# Patient Record
Sex: MALE | Race: WHITE
[De-identification: names, ages, dates, MRNs, and addresses within clinical notes are randomized per-mention and may not be internally consistent; named-entity substitution may affect disease eponyms.]

---

## 2017-01-30 PROBLEM — M17.12 PRIMARY OSTEOARTHRITIS OF LEFT KNEE: Status: ACTIVE | Noted: 2017-01-30

## 2017-03-01 ENCOUNTER — PRIOR ORIGINAL RECORDS (OUTPATIENT)
Dept: OTHER | Age: 62
End: 2017-03-01

## 2017-03-14 ENCOUNTER — HOSPITAL (OUTPATIENT)
Dept: OTHER | Age: 62
End: 2017-03-14
Attending: INTERNAL MEDICINE

## 2017-03-14 ENCOUNTER — CHARTING TRANS (OUTPATIENT)
Dept: OTHER | Age: 62
End: 2017-03-14

## 2017-03-15 LAB
ANALYZER ANC (IANC): ABNORMAL
ANION GAP SERPL CALC-SCNC: 12 MMOL/L (ref 10–20)
BASOPHILS # BLD: 0.1 THOUSAND/MCL (ref 0–0.3)
BASOPHILS NFR BLD: 1 %
BUN SERPL-MCNC: 23 MG/DL (ref 6–20)
BUN/CREAT SERPL: 23 (ref 7–25)
CALCIUM SERPL-MCNC: 8.7 MG/DL (ref 8.4–10.2)
CHLORIDE: 106 MMOL/L (ref 98–107)
CO2 SERPL-SCNC: 28 MMOL/L (ref 21–32)
CREAT SERPL-MCNC: 0.98 MG/DL (ref 0.67–1.17)
DIFFERENTIAL METHOD BLD: ABNORMAL
EOSINOPHIL # BLD: 0.1 THOUSAND/MCL (ref 0.1–0.5)
EOSINOPHIL NFR BLD: 1 %
ERYTHROCYTE [DISTWIDTH] IN BLOOD: 15.7 % (ref 11–15)
GLUCOSE SERPL-MCNC: 123 MG/DL (ref 65–99)
HEMATOCRIT: 42.1 % (ref 39–51)
HGB BLD-MCNC: 14 GM/DL (ref 13–17)
INR PPP: 1.9
LYMPHOCYTES # BLD: 1.4 THOUSAND/MCL (ref 1–4)
LYMPHOCYTES NFR BLD: 13 %
MAGNESIUM SERPL-MCNC: 2 MG/DL (ref 1.7–2.4)
MCH RBC QN AUTO: 27.3 PG (ref 26–34)
MCHC RBC AUTO-ENTMCNC: 33.3 GM/DL (ref 32–36.5)
MCV RBC AUTO: 82.2 FL (ref 78–100)
MONOCYTES # BLD: 0.8 THOUSAND/MCL (ref 0.3–0.9)
MONOCYTES NFR BLD: 7 %
NEUTROPHILS # BLD: 8.2 THOUSAND/MCL (ref 1.8–7.7)
NEUTROPHILS NFR BLD: 78 %
NEUTS SEG NFR BLD: ABNORMAL %
PERCENT NRBC: ABNORMAL
PLATELET # BLD: 155 THOUSAND/MCL (ref 140–450)
POTASSIUM SERPL-SCNC: 3.6 MMOL/L (ref 3.4–5.1)
PROTHROMBIN TIME: 20.9 SECONDS (ref 9.7–11.8)
PROTHROMBIN TIME: ABNORMAL
RBC # BLD: 5.12 MILLION/MCL (ref 4.5–5.9)
SODIUM SERPL-SCNC: 142 MMOL/L (ref 135–145)
TSH SERPL-ACNC: 1.45 MCUNIT/ML (ref 0.35–5)
WBC # BLD: 10.5 THOUSAND/MCL (ref 4.2–11)

## 2017-03-16 LAB
ANALYZER ANC (IANC): ABNORMAL
ANION GAP SERPL CALC-SCNC: 16 MMOL/L (ref 10–20)
BASOPHILS # BLD: 0 THOUSAND/MCL (ref 0–0.3)
BASOPHILS NFR BLD: 0 %
BUN SERPL-MCNC: 22 MG/DL (ref 6–20)
BUN/CREAT SERPL: 21 (ref 7–25)
CALCIUM SERPL-MCNC: 8.7 MG/DL (ref 8.4–10.2)
CHLORIDE: 104 MMOL/L (ref 98–107)
CO2 SERPL-SCNC: 24 MMOL/L (ref 21–32)
CREAT SERPL-MCNC: 1.03 MG/DL (ref 0.67–1.17)
DIFFERENTIAL METHOD BLD: ABNORMAL
EOSINOPHIL # BLD: 0.1 THOUSAND/MCL (ref 0.1–0.5)
EOSINOPHIL NFR BLD: 1 %
ERYTHROCYTE [DISTWIDTH] IN BLOOD: 15.5 % (ref 11–15)
GLUCOSE SERPL-MCNC: 117 MG/DL (ref 65–99)
HEMATOCRIT: 41.9 % (ref 39–51)
HGB BLD-MCNC: 14 GM/DL (ref 13–17)
INR PPP: 1.5
LYMPHOCYTES # BLD: 1.1 THOUSAND/MCL (ref 1–4)
LYMPHOCYTES NFR BLD: 11 %
MCH RBC QN AUTO: 27.3 PG (ref 26–34)
MCHC RBC AUTO-ENTMCNC: 33.4 GM/DL (ref 32–36.5)
MCV RBC AUTO: 81.8 FL (ref 78–100)
MONOCYTES # BLD: 0.9 THOUSAND/MCL (ref 0.3–0.9)
MONOCYTES NFR BLD: 9 %
NEUTROPHILS # BLD: 8.2 THOUSAND/MCL (ref 1.8–7.7)
NEUTROPHILS NFR BLD: 79 %
NEUTS SEG NFR BLD: ABNORMAL %
PERCENT NRBC: ABNORMAL
PLATELET # BLD: 166 THOUSAND/MCL (ref 140–450)
POTASSIUM SERPL-SCNC: 3.5 MMOL/L (ref 3.4–5.1)
PROTHROMBIN TIME: 16.4 SECONDS (ref 9.7–11.8)
PROTHROMBIN TIME: ABNORMAL
RBC # BLD: 5.12 MILLION/MCL (ref 4.5–5.9)
SODIUM SERPL-SCNC: 140 MMOL/L (ref 135–145)
WBC # BLD: 10.3 THOUSAND/MCL (ref 4.2–11)

## 2017-03-20 ENCOUNTER — PRIOR ORIGINAL RECORDS (OUTPATIENT)
Dept: OTHER | Age: 62
End: 2017-03-20

## 2017-03-31 PROBLEM — Z99.89 OSA ON CPAP: Status: ACTIVE | Noted: 2017-03-31

## 2017-03-31 PROBLEM — I34.0 NON-RHEUMATIC MITRAL REGURGITATION: Status: ACTIVE | Noted: 2017-03-31

## 2017-03-31 PROBLEM — Z79.01 ANTICOAGULATED ON COUMADIN: Status: ACTIVE | Noted: 2017-03-31

## 2017-03-31 PROBLEM — I42.8 NON-ISCHEMIC CARDIOMYOPATHY (HCC): Status: ACTIVE | Noted: 2017-03-31

## 2017-03-31 PROBLEM — R94.30 CARDIAC LV EJECTION FRACTION 21-40%: Status: ACTIVE | Noted: 2017-03-31

## 2017-03-31 PROBLEM — I36.1 NON-RHEUMATIC TRICUSPID VALVE INSUFFICIENCY: Status: ACTIVE | Noted: 2017-03-31

## 2017-03-31 PROBLEM — G47.33 OSA ON CPAP: Status: ACTIVE | Noted: 2017-03-31

## 2017-03-31 PROBLEM — I25.10 CORONARY ARTERY DISEASE INVOLVING NATIVE CORONARY ARTERY OF NATIVE HEART WITHOUT ANGINA PECTORIS: Status: ACTIVE | Noted: 2017-03-31

## 2017-04-07 LAB
BUN: 21 MG/DL
CALCIUM: 8.6 MG/DL
CHLORIDE: 104 MEQ/L
CREATININE, SERUM: 1.07 MG/DL
GLUCOSE: 97 MG/DL
HEMATOCRIT: 33 %
HEMOGLOBIN: 11.3 G/DL
MCH: 28.2 PG
MCHC: 342 G/DL
MCV: 82.3 FL
PLATELETS: 270 K/UL
POTASSIUM, SERUM: 4 MEQ/L
RED BLOOD COUNT: 4.01 X 10-6/U
SODIUM: 142 MEQ/L
WHITE BLOOD COUNT: 15.5 X 10-3/U

## 2017-04-10 ENCOUNTER — PRIOR ORIGINAL RECORDS (OUTPATIENT)
Dept: OTHER | Age: 62
End: 2017-04-10

## 2017-05-10 ENCOUNTER — PRIOR ORIGINAL RECORDS (OUTPATIENT)
Dept: OTHER | Age: 62
End: 2017-05-10

## 2017-08-07 PROBLEM — T84.84XA PAINFUL TOTAL KNEE REPLACEMENT, INITIAL ENCOUNTER  (HCC): Status: ACTIVE | Noted: 2017-08-07

## 2017-08-07 PROBLEM — Z96.659 PAINFUL TOTAL KNEE REPLACEMENT, INITIAL ENCOUNTER  (HCC): Status: ACTIVE | Noted: 2017-08-07

## 2017-08-07 PROBLEM — T84.84XA PAINFUL TOTAL KNEE REPLACEMENT, INITIAL ENCOUNTER (HCC): Status: ACTIVE | Noted: 2017-08-07

## 2017-08-07 PROBLEM — Z96.659 PAINFUL TOTAL KNEE REPLACEMENT, INITIAL ENCOUNTER (HCC): Status: ACTIVE | Noted: 2017-08-07

## 2017-08-07 PROBLEM — Z96.651 H/O TOTAL KNEE REPLACEMENT, RIGHT: Status: ACTIVE | Noted: 2017-08-07

## 2017-08-07 PROBLEM — Z96.659 PAINFUL TOTAL KNEE REPLACEMENT, INITIAL ENCOUNTER: Status: ACTIVE | Noted: 2017-08-07

## 2017-08-07 PROBLEM — T84.84XA PAINFUL TOTAL KNEE REPLACEMENT, INITIAL ENCOUNTER: Status: ACTIVE | Noted: 2017-08-07

## 2017-08-21 ENCOUNTER — PRIOR ORIGINAL RECORDS (OUTPATIENT)
Dept: OTHER | Age: 62
End: 2017-08-21

## 2017-08-24 PROBLEM — M76.31 ILIOTIBIAL BAND SYNDROME, RIGHT: Status: ACTIVE | Noted: 2017-08-24

## 2017-08-24 PROBLEM — S82.001A CLOSED NONDISPLACED FRACTURE OF RIGHT PATELLA, UNSPECIFIED FRACTURE MORPHOLOGY, INITIAL ENCOUNTER: Status: ACTIVE | Noted: 2017-08-24

## 2017-11-07 PROBLEM — M97.8XXA: Status: ACTIVE | Noted: 2017-11-07

## 2017-11-07 PROBLEM — Z96.659: Status: ACTIVE | Noted: 2017-11-07

## 2017-12-11 ENCOUNTER — PRIOR ORIGINAL RECORDS (OUTPATIENT)
Dept: OTHER | Age: 62
End: 2017-12-11

## 2018-09-28 ENCOUNTER — PRIOR ORIGINAL RECORDS (OUTPATIENT)
Dept: OTHER | Age: 63
End: 2018-09-28

## 2018-10-15 ENCOUNTER — MYAURORA ACCOUNT LINK (OUTPATIENT)
Dept: OTHER | Age: 63
End: 2018-10-15

## 2018-10-15 ENCOUNTER — PRIOR ORIGINAL RECORDS (OUTPATIENT)
Dept: OTHER | Age: 63
End: 2018-10-15

## 2019-02-28 VITALS
HEART RATE: 88 BPM | SYSTOLIC BLOOD PRESSURE: 118 MMHG | RESPIRATION RATE: 20 BRPM | WEIGHT: 278 LBS | DIASTOLIC BLOOD PRESSURE: 80 MMHG | HEIGHT: 71 IN | BODY MASS INDEX: 38.92 KG/M2

## 2019-02-28 VITALS
HEIGHT: 71 IN | DIASTOLIC BLOOD PRESSURE: 80 MMHG | OXYGEN SATURATION: 98 % | SYSTOLIC BLOOD PRESSURE: 136 MMHG | WEIGHT: 268 LBS | BODY MASS INDEX: 37.52 KG/M2 | HEART RATE: 87 BPM | RESPIRATION RATE: 18 BRPM

## 2019-02-28 VITALS
RESPIRATION RATE: 20 BRPM | BODY MASS INDEX: 38.78 KG/M2 | WEIGHT: 277 LBS | HEIGHT: 71 IN | HEART RATE: 100 BPM | SYSTOLIC BLOOD PRESSURE: 112 MMHG | DIASTOLIC BLOOD PRESSURE: 80 MMHG

## 2019-03-01 VITALS
SYSTOLIC BLOOD PRESSURE: 119 MMHG | RESPIRATION RATE: 20 BRPM | WEIGHT: 262 LBS | BODY MASS INDEX: 36.68 KG/M2 | HEIGHT: 71 IN | HEART RATE: 96 BPM | DIASTOLIC BLOOD PRESSURE: 77 MMHG

## 2019-03-01 VITALS
RESPIRATION RATE: 18 BRPM | HEIGHT: 71 IN | WEIGHT: 260 LBS | DIASTOLIC BLOOD PRESSURE: 79 MMHG | SYSTOLIC BLOOD PRESSURE: 138 MMHG | HEART RATE: 84 BPM | BODY MASS INDEX: 36.4 KG/M2

## 2019-03-04 PROBLEM — R94.30 CARDIAC LV EJECTION FRACTION 21-40%: Status: RESOLVED | Noted: 2017-03-31 | Resolved: 2019-03-04

## 2019-03-13 ENCOUNTER — TELEPHONE (OUTPATIENT)
Dept: SCHEDULING | Age: 64
End: 2019-03-13

## 2019-04-17 ENCOUNTER — LAB ENCOUNTER (OUTPATIENT)
Dept: LAB | Age: 64
End: 2019-04-17
Attending: NURSE PRACTITIONER
Payer: COMMERCIAL

## 2019-04-17 DIAGNOSIS — I48.20 CHRONIC ATRIAL FIBRILLATION (HCC): ICD-10-CM

## 2019-04-17 PROCEDURE — 85025 COMPLETE CBC W/AUTO DIFF WBC: CPT

## 2019-04-17 PROCEDURE — 80053 COMPREHEN METABOLIC PANEL: CPT

## 2019-04-17 PROCEDURE — 80061 LIPID PANEL: CPT

## 2019-04-24 PROBLEM — I25.10 CORONARY ARTERY DISEASE INVOLVING NATIVE CORONARY ARTERY OF NATIVE HEART WITHOUT ANGINA PECTORIS: Status: RESOLVED | Noted: 2017-03-31 | Resolved: 2019-04-24

## 2019-04-24 PROBLEM — I42.8 NICM (NONISCHEMIC CARDIOMYOPATHY) (HCC): Status: ACTIVE | Noted: 2017-03-31

## 2020-05-13 PROBLEM — Z79.01 LONG TERM (CURRENT) USE OF ANTICOAGULANTS: Status: ACTIVE | Noted: 2020-05-13

## 2020-11-05 ENCOUNTER — LAB ENCOUNTER (OUTPATIENT)
Dept: LAB | Age: 65
End: 2020-11-05
Attending: PHYSICIAN ASSISTANT
Payer: COMMERCIAL

## 2021-01-27 DIAGNOSIS — Z23 NEED FOR VACCINATION: ICD-10-CM

## 2021-07-01 ENCOUNTER — LAB SERVICES (OUTPATIENT)
Dept: LAB | Age: 66
End: 2021-07-01

## 2022-03-22 PROBLEM — Z79.01 LONG TERM (CURRENT) USE OF ANTICOAGULANTS: Status: RESOLVED | Noted: 2020-05-13 | Resolved: 2022-02-01

## 2022-09-15 RX ORDER — FUROSEMIDE 20 MG/1
20 TABLET ORAL DAILY
COMMUNITY

## 2022-09-30 ENCOUNTER — ANESTHESIA EVENT (OUTPATIENT)
Dept: SURGERY | Facility: HOSPITAL | Age: 67
End: 2022-09-30
Payer: COMMERCIAL

## 2022-10-03 ENCOUNTER — LAB ENCOUNTER (OUTPATIENT)
Dept: LAB | Age: 67
End: 2022-10-03
Attending: ORTHOPAEDIC SURGERY
Payer: COMMERCIAL

## 2022-10-03 ENCOUNTER — LABORATORY ENCOUNTER (OUTPATIENT)
Dept: LAB | Age: 67
End: 2022-10-03
Attending: ORTHOPAEDIC SURGERY
Payer: COMMERCIAL

## 2022-10-03 DIAGNOSIS — Z01.818 PRE-OP TESTING: ICD-10-CM

## 2022-10-03 LAB
ANTIBODY SCREEN: NEGATIVE
RH BLOOD TYPE: POSITIVE

## 2022-10-03 PROCEDURE — 86901 BLOOD TYPING SEROLOGIC RH(D): CPT

## 2022-10-03 PROCEDURE — 86850 RBC ANTIBODY SCREEN: CPT

## 2022-10-03 PROCEDURE — 86900 BLOOD TYPING SEROLOGIC ABO: CPT

## 2022-10-04 LAB — SARS-COV-2 RNA RESP QL NAA+PROBE: NOT DETECTED

## 2022-10-06 ENCOUNTER — HOSPITAL ENCOUNTER (OUTPATIENT)
Facility: HOSPITAL | Age: 67
Discharge: HOME HEALTH | End: 2022-10-07
Attending: ORTHOPAEDIC SURGERY | Admitting: ORTHOPAEDIC SURGERY
Payer: COMMERCIAL

## 2022-10-06 ENCOUNTER — APPOINTMENT (OUTPATIENT)
Dept: GENERAL RADIOLOGY | Facility: HOSPITAL | Age: 67
End: 2022-10-06
Attending: PHYSICIAN ASSISTANT
Payer: COMMERCIAL

## 2022-10-06 ENCOUNTER — ANESTHESIA (OUTPATIENT)
Dept: SURGERY | Facility: HOSPITAL | Age: 67
End: 2022-10-06
Payer: COMMERCIAL

## 2022-10-06 DIAGNOSIS — Z01.818 PRE-OP TESTING: Primary | ICD-10-CM

## 2022-10-06 PROCEDURE — 76942 ECHO GUIDE FOR BIOPSY: CPT | Performed by: ANESTHESIOLOGY

## 2022-10-06 PROCEDURE — 73560 X-RAY EXAM OF KNEE 1 OR 2: CPT | Performed by: PHYSICIAN ASSISTANT

## 2022-10-06 PROCEDURE — 97116 GAIT TRAINING THERAPY: CPT

## 2022-10-06 PROCEDURE — 88311 DECALCIFY TISSUE: CPT | Performed by: ORTHOPAEDIC SURGERY

## 2022-10-06 PROCEDURE — 97161 PT EVAL LOW COMPLEX 20 MIN: CPT

## 2022-10-06 PROCEDURE — 88305 TISSUE EXAM BY PATHOLOGIST: CPT | Performed by: ORTHOPAEDIC SURGERY

## 2022-10-06 PROCEDURE — 0SRD069 REPLACEMENT OF LEFT KNEE JOINT WITH OXIDIZED ZIRCONIUM ON POLYETHYLENE SYNTHETIC SUBSTITUTE, CEMENTED, OPEN APPROACH: ICD-10-PCS | Performed by: ORTHOPAEDIC SURGERY

## 2022-10-06 DEVICE — SMARTSET GHV GENTAMICIN HIGH VISCOSITY BONE CEMENT 40G
Type: IMPLANTABLE DEVICE | Site: KNEE | Status: FUNCTIONAL
Brand: SMARTSET

## 2022-10-06 DEVICE — ATTUNE PATELLA MEDIALIZED ANATOMIC 38MM CEMENTED AOX
Type: IMPLANTABLE DEVICE | Site: KNEE | Status: FUNCTIONAL
Brand: ATTUNE

## 2022-10-06 DEVICE — ATTUNE KNEE SYSTEM FEMORAL POSTERIOR STABILIZED SIZE 7 LEFT CEMENTED
Type: IMPLANTABLE DEVICE | Site: KNEE | Status: FUNCTIONAL
Brand: ATTUNE

## 2022-10-06 DEVICE — ATTUNE KNEE SYSTEM TIBIAL BASE ROTATING PLATFORM SIZE 6 CEMENTED
Type: IMPLANTABLE DEVICE | Site: KNEE | Status: FUNCTIONAL
Brand: ATTUNE

## 2022-10-06 DEVICE — ATTUNE KNEE SYSTEM TIBIAL INSERT ROTATING PLATFORM POSTERIOR STABILIZED 7 5MM AOX
Type: IMPLANTABLE DEVICE | Site: KNEE | Status: FUNCTIONAL
Brand: ATTUNE

## 2022-10-06 RX ORDER — SODIUM CHLORIDE, SODIUM LACTATE, POTASSIUM CHLORIDE, CALCIUM CHLORIDE 600; 310; 30; 20 MG/100ML; MG/100ML; MG/100ML; MG/100ML
INJECTION, SOLUTION INTRAVENOUS CONTINUOUS
Status: DISCONTINUED | OUTPATIENT
Start: 2022-10-06 | End: 2022-10-06 | Stop reason: HOSPADM

## 2022-10-06 RX ORDER — HYDROMORPHONE HYDROCHLORIDE 1 MG/ML
0.6 INJECTION, SOLUTION INTRAMUSCULAR; INTRAVENOUS; SUBCUTANEOUS EVERY 5 MIN PRN
Status: DISCONTINUED | OUTPATIENT
Start: 2022-10-06 | End: 2022-10-06 | Stop reason: HOSPADM

## 2022-10-06 RX ORDER — MELATONIN
325
Status: DISCONTINUED | OUTPATIENT
Start: 2022-10-07 | End: 2022-10-07

## 2022-10-06 RX ORDER — DEXAMETHASONE SODIUM PHOSPHATE 10 MG/ML
8 INJECTION, SOLUTION INTRAMUSCULAR; INTRAVENOUS ONCE
Status: COMPLETED | OUTPATIENT
Start: 2022-10-07 | End: 2022-10-07

## 2022-10-06 RX ORDER — ACETAMINOPHEN 500 MG
1000 TABLET ORAL ONCE AS NEEDED
Status: DISCONTINUED | OUTPATIENT
Start: 2022-10-06 | End: 2022-10-06 | Stop reason: HOSPADM

## 2022-10-06 RX ORDER — OXYCODONE HYDROCHLORIDE 5 MG/1
2.5 TABLET ORAL EVERY 4 HOURS PRN
Status: DISCONTINUED | OUTPATIENT
Start: 2022-10-06 | End: 2022-10-07

## 2022-10-06 RX ORDER — NALOXONE HYDROCHLORIDE 0.4 MG/ML
80 INJECTION, SOLUTION INTRAMUSCULAR; INTRAVENOUS; SUBCUTANEOUS AS NEEDED
Status: DISCONTINUED | OUTPATIENT
Start: 2022-10-06 | End: 2022-10-06 | Stop reason: HOSPADM

## 2022-10-06 RX ORDER — NEOSTIGMINE METHYLSULFATE 1 MG/ML
INJECTION, SOLUTION INTRAVENOUS AS NEEDED
Status: DISCONTINUED | OUTPATIENT
Start: 2022-10-06 | End: 2022-10-06 | Stop reason: SURG

## 2022-10-06 RX ORDER — BUPRENORPHINE HYDROCHLORIDE 0.32 MG/ML
INJECTION INTRAMUSCULAR; INTRAVENOUS AS NEEDED
Status: DISCONTINUED | OUTPATIENT
Start: 2022-10-06 | End: 2022-10-06 | Stop reason: SURG

## 2022-10-06 RX ORDER — HYDROCODONE BITARTRATE AND ACETAMINOPHEN 5; 325 MG/1; MG/1
1 TABLET ORAL ONCE AS NEEDED
Status: DISCONTINUED | OUTPATIENT
Start: 2022-10-06 | End: 2022-10-06 | Stop reason: HOSPADM

## 2022-10-06 RX ORDER — GLYCOPYRROLATE 0.2 MG/ML
INJECTION, SOLUTION INTRAMUSCULAR; INTRAVENOUS AS NEEDED
Status: DISCONTINUED | OUTPATIENT
Start: 2022-10-06 | End: 2022-10-06 | Stop reason: SURG

## 2022-10-06 RX ORDER — DIPHENHYDRAMINE HCL 25 MG
25 CAPSULE ORAL EVERY 4 HOURS PRN
Status: DISCONTINUED | OUTPATIENT
Start: 2022-10-06 | End: 2022-10-07

## 2022-10-06 RX ORDER — DOCUSATE SODIUM 100 MG/1
100 CAPSULE, LIQUID FILLED ORAL 2 TIMES DAILY
Status: DISCONTINUED | OUTPATIENT
Start: 2022-10-06 | End: 2022-10-07

## 2022-10-06 RX ORDER — HYDROMORPHONE HYDROCHLORIDE 1 MG/ML
0.4 INJECTION, SOLUTION INTRAMUSCULAR; INTRAVENOUS; SUBCUTANEOUS EVERY 2 HOUR PRN
Status: DISCONTINUED | OUTPATIENT
Start: 2022-10-06 | End: 2022-10-07

## 2022-10-06 RX ORDER — HYDROMORPHONE HYDROCHLORIDE 1 MG/ML
INJECTION, SOLUTION INTRAMUSCULAR; INTRAVENOUS; SUBCUTANEOUS
Status: COMPLETED
Start: 2022-10-06 | End: 2022-10-06

## 2022-10-06 RX ORDER — CEFAZOLIN SODIUM/WATER 2 G/20 ML
2 SYRINGE (ML) INTRAVENOUS EVERY 8 HOURS
Status: COMPLETED | OUTPATIENT
Start: 2022-10-06 | End: 2022-10-07

## 2022-10-06 RX ORDER — DILTIAZEM HYDROCHLORIDE 120 MG/1
240 CAPSULE, EXTENDED RELEASE ORAL DAILY
Status: DISCONTINUED | OUTPATIENT
Start: 2022-10-06 | End: 2022-10-07

## 2022-10-06 RX ORDER — SODIUM CHLORIDE, SODIUM LACTATE, POTASSIUM CHLORIDE, CALCIUM CHLORIDE 600; 310; 30; 20 MG/100ML; MG/100ML; MG/100ML; MG/100ML
INJECTION, SOLUTION INTRAVENOUS CONTINUOUS
Status: DISCONTINUED | OUTPATIENT
Start: 2022-10-06 | End: 2022-10-07 | Stop reason: ALTCHOICE

## 2022-10-06 RX ORDER — CYCLOBENZAPRINE HCL 5 MG
5 TABLET ORAL EVERY 8 HOURS PRN
Status: DISCONTINUED | OUTPATIENT
Start: 2022-10-06 | End: 2022-10-07

## 2022-10-06 RX ORDER — ACETAMINOPHEN 325 MG/1
TABLET ORAL
Status: COMPLETED
Start: 2022-10-06 | End: 2022-10-06

## 2022-10-06 RX ORDER — ONDANSETRON 2 MG/ML
4 INJECTION INTRAMUSCULAR; INTRAVENOUS EVERY 6 HOURS PRN
Status: DISCONTINUED | OUTPATIENT
Start: 2022-10-06 | End: 2022-10-06 | Stop reason: HOSPADM

## 2022-10-06 RX ORDER — MEPERIDINE HYDROCHLORIDE 25 MG/ML
12.5 INJECTION INTRAMUSCULAR; INTRAVENOUS; SUBCUTANEOUS AS NEEDED
Status: DISCONTINUED | OUTPATIENT
Start: 2022-10-06 | End: 2022-10-06 | Stop reason: HOSPADM

## 2022-10-06 RX ORDER — LIDOCAINE HYDROCHLORIDE 10 MG/ML
INJECTION, SOLUTION EPIDURAL; INFILTRATION; INTRACAUDAL; PERINEURAL AS NEEDED
Status: DISCONTINUED | OUTPATIENT
Start: 2022-10-06 | End: 2022-10-06 | Stop reason: SURG

## 2022-10-06 RX ORDER — HYDROMORPHONE HYDROCHLORIDE 1 MG/ML
0.2 INJECTION, SOLUTION INTRAMUSCULAR; INTRAVENOUS; SUBCUTANEOUS EVERY 5 MIN PRN
Status: DISCONTINUED | OUTPATIENT
Start: 2022-10-06 | End: 2022-10-06 | Stop reason: HOSPADM

## 2022-10-06 RX ORDER — BISACODYL 10 MG
10 SUPPOSITORY, RECTAL RECTAL
Status: DISCONTINUED | OUTPATIENT
Start: 2022-10-06 | End: 2022-10-07

## 2022-10-06 RX ORDER — KETOROLAC TROMETHAMINE 30 MG/ML
30 INJECTION, SOLUTION INTRAMUSCULAR; INTRAVENOUS EVERY 6 HOURS
Status: DISPENSED | OUTPATIENT
Start: 2022-10-06 | End: 2022-10-07

## 2022-10-06 RX ORDER — VANCOMYCIN HYDROCHLORIDE
15 ONCE
Status: DISCONTINUED | OUTPATIENT
Start: 2022-10-06 | End: 2022-10-06

## 2022-10-06 RX ORDER — ONDANSETRON 2 MG/ML
4 INJECTION INTRAMUSCULAR; INTRAVENOUS EVERY 6 HOURS PRN
Status: DISCONTINUED | OUTPATIENT
Start: 2022-10-06 | End: 2022-10-07

## 2022-10-06 RX ORDER — OXYCODONE HYDROCHLORIDE 5 MG/1
5 TABLET ORAL EVERY 4 HOURS PRN
Status: DISCONTINUED | OUTPATIENT
Start: 2022-10-06 | End: 2022-10-07

## 2022-10-06 RX ORDER — LABETALOL HYDROCHLORIDE 5 MG/ML
INJECTION, SOLUTION INTRAVENOUS AS NEEDED
Status: DISCONTINUED | OUTPATIENT
Start: 2022-10-06 | End: 2022-10-06 | Stop reason: SURG

## 2022-10-06 RX ORDER — MIDAZOLAM HYDROCHLORIDE 1 MG/ML
1 INJECTION INTRAMUSCULAR; INTRAVENOUS EVERY 5 MIN PRN
Status: DISCONTINUED | OUTPATIENT
Start: 2022-10-06 | End: 2022-10-06 | Stop reason: HOSPADM

## 2022-10-06 RX ORDER — ONDANSETRON 2 MG/ML
INJECTION INTRAMUSCULAR; INTRAVENOUS
Status: COMPLETED
Start: 2022-10-06 | End: 2022-10-06

## 2022-10-06 RX ORDER — HYDROMORPHONE HYDROCHLORIDE 1 MG/ML
0.4 INJECTION, SOLUTION INTRAMUSCULAR; INTRAVENOUS; SUBCUTANEOUS EVERY 5 MIN PRN
Status: DISCONTINUED | OUTPATIENT
Start: 2022-10-06 | End: 2022-10-06 | Stop reason: HOSPADM

## 2022-10-06 RX ORDER — METOCLOPRAMIDE HYDROCHLORIDE 5 MG/ML
10 INJECTION INTRAMUSCULAR; INTRAVENOUS EVERY 8 HOURS PRN
Status: DISCONTINUED | OUTPATIENT
Start: 2022-10-06 | End: 2022-10-06 | Stop reason: HOSPADM

## 2022-10-06 RX ORDER — DIPHENHYDRAMINE HYDROCHLORIDE 50 MG/ML
12.5 INJECTION INTRAMUSCULAR; INTRAVENOUS EVERY 4 HOURS PRN
Status: DISCONTINUED | OUTPATIENT
Start: 2022-10-06 | End: 2022-10-07

## 2022-10-06 RX ORDER — TRANEXAMIC ACID 10 MG/ML
1000 INJECTION, SOLUTION INTRAVENOUS ONCE
Status: COMPLETED | OUTPATIENT
Start: 2022-10-06 | End: 2022-10-06

## 2022-10-06 RX ORDER — TRAMADOL HYDROCHLORIDE 50 MG/1
50 TABLET ORAL EVERY 6 HOURS SCHEDULED
Status: DISCONTINUED | OUTPATIENT
Start: 2022-10-06 | End: 2022-10-07

## 2022-10-06 RX ORDER — VANCOMYCIN/0.9 % SOD CHLORIDE 1.75 G/5
15 PLASTIC BAG, INJECTION (ML) INTRAVENOUS ONCE
Status: CANCELLED | OUTPATIENT
Start: 2022-10-06 | End: 2022-10-06

## 2022-10-06 RX ORDER — POLYETHYLENE GLYCOL 3350 17 G/17G
17 POWDER, FOR SOLUTION ORAL DAILY PRN
Status: DISCONTINUED | OUTPATIENT
Start: 2022-10-06 | End: 2022-10-07

## 2022-10-06 RX ORDER — DEXAMETHASONE SODIUM PHOSPHATE 4 MG/ML
VIAL (ML) INJECTION AS NEEDED
Status: DISCONTINUED | OUTPATIENT
Start: 2022-10-06 | End: 2022-10-06 | Stop reason: SURG

## 2022-10-06 RX ORDER — HYDROMORPHONE HYDROCHLORIDE 1 MG/ML
0.2 INJECTION, SOLUTION INTRAMUSCULAR; INTRAVENOUS; SUBCUTANEOUS EVERY 2 HOUR PRN
Status: DISCONTINUED | OUTPATIENT
Start: 2022-10-06 | End: 2022-10-07

## 2022-10-06 RX ORDER — HYDROCODONE BITARTRATE AND ACETAMINOPHEN 5; 325 MG/1; MG/1
2 TABLET ORAL ONCE AS NEEDED
Status: DISCONTINUED | OUTPATIENT
Start: 2022-10-06 | End: 2022-10-06 | Stop reason: HOSPADM

## 2022-10-06 RX ORDER — DIPHENHYDRAMINE HYDROCHLORIDE 50 MG/ML
25 INJECTION INTRAMUSCULAR; INTRAVENOUS ONCE AS NEEDED
Status: ACTIVE | OUTPATIENT
Start: 2022-10-06 | End: 2022-10-06

## 2022-10-06 RX ORDER — ACETAMINOPHEN 325 MG/1
650 TABLET ORAL ONCE
Status: DISCONTINUED | OUTPATIENT
Start: 2022-10-06 | End: 2022-10-06 | Stop reason: HOSPADM

## 2022-10-06 RX ORDER — SENNOSIDES 8.6 MG
17.2 TABLET ORAL NIGHTLY
Status: DISCONTINUED | OUTPATIENT
Start: 2022-10-06 | End: 2022-10-07

## 2022-10-06 RX ORDER — SODIUM PHOSPHATE, DIBASIC AND SODIUM PHOSPHATE, MONOBASIC 7; 19 G/133ML; G/133ML
1 ENEMA RECTAL ONCE AS NEEDED
Status: DISCONTINUED | OUTPATIENT
Start: 2022-10-06 | End: 2022-10-07

## 2022-10-06 RX ORDER — CEFAZOLIN SODIUM 1 G/3ML
INJECTION, POWDER, FOR SOLUTION INTRAMUSCULAR; INTRAVENOUS AS NEEDED
Status: DISCONTINUED | OUTPATIENT
Start: 2022-10-06 | End: 2022-10-06 | Stop reason: SURG

## 2022-10-06 RX ORDER — ROCURONIUM BROMIDE 10 MG/ML
INJECTION, SOLUTION INTRAVENOUS AS NEEDED
Status: DISCONTINUED | OUTPATIENT
Start: 2022-10-06 | End: 2022-10-06 | Stop reason: SURG

## 2022-10-06 RX ORDER — ONDANSETRON 2 MG/ML
INJECTION INTRAMUSCULAR; INTRAVENOUS AS NEEDED
Status: DISCONTINUED | OUTPATIENT
Start: 2022-10-06 | End: 2022-10-06 | Stop reason: SURG

## 2022-10-06 RX ORDER — ACETAMINOPHEN 325 MG/1
650 TABLET ORAL 4 TIMES DAILY
Status: DISCONTINUED | OUTPATIENT
Start: 2022-10-06 | End: 2022-10-07

## 2022-10-06 RX ORDER — ACETAMINOPHEN 500 MG
1000 TABLET ORAL ONCE
Status: DISCONTINUED | OUTPATIENT
Start: 2022-10-06 | End: 2022-10-06 | Stop reason: HOSPADM

## 2022-10-06 RX ORDER — METOCLOPRAMIDE HYDROCHLORIDE 5 MG/ML
10 INJECTION INTRAMUSCULAR; INTRAVENOUS EVERY 8 HOURS PRN
Status: DISCONTINUED | OUTPATIENT
Start: 2022-10-06 | End: 2022-10-07

## 2022-10-06 RX ADMIN — NEOSTIGMINE METHYLSULFATE 3 MG: 1 INJECTION, SOLUTION INTRAVENOUS at 10:49:00

## 2022-10-06 RX ADMIN — SODIUM CHLORIDE, SODIUM LACTATE, POTASSIUM CHLORIDE, CALCIUM CHLORIDE: 600; 310; 30; 20 INJECTION, SOLUTION INTRAVENOUS at 09:22:00

## 2022-10-06 RX ADMIN — LABETALOL HYDROCHLORIDE 10 MG: 5 INJECTION, SOLUTION INTRAVENOUS at 10:52:00

## 2022-10-06 RX ADMIN — DEXAMETHASONE SODIUM PHOSPHATE 8 MG: 4 MG/ML VIAL (ML) INJECTION at 09:50:00

## 2022-10-06 RX ADMIN — LIDOCAINE HYDROCHLORIDE 50 MG: 10 INJECTION, SOLUTION EPIDURAL; INFILTRATION; INTRACAUDAL; PERINEURAL at 09:24:00

## 2022-10-06 RX ADMIN — BUPRENORPHINE HYDROCHLORIDE 150 MCG: 0.32 INJECTION INTRAMUSCULAR; INTRAVENOUS at 09:30:00

## 2022-10-06 RX ADMIN — SODIUM CHLORIDE, SODIUM LACTATE, POTASSIUM CHLORIDE, CALCIUM CHLORIDE: 600; 310; 30; 20 INJECTION, SOLUTION INTRAVENOUS at 11:00:00

## 2022-10-06 RX ADMIN — TRANEXAMIC ACID 1000 MG: 10 INJECTION, SOLUTION INTRAVENOUS at 09:41:00

## 2022-10-06 RX ADMIN — GLYCOPYRROLATE 0.6 MG: 0.2 INJECTION, SOLUTION INTRAMUSCULAR; INTRAVENOUS at 10:49:00

## 2022-10-06 RX ADMIN — ONDANSETRON 4 MG: 2 INJECTION INTRAMUSCULAR; INTRAVENOUS at 10:31:00

## 2022-10-06 RX ADMIN — ROCURONIUM BROMIDE 50 MG: 10 INJECTION, SOLUTION INTRAVENOUS at 09:24:00

## 2022-10-06 RX ADMIN — DEXAMETHASONE SODIUM PHOSPHATE 2 MG: 4 MG/ML VIAL (ML) INJECTION at 09:30:00

## 2022-10-06 RX ADMIN — CEFAZOLIN SODIUM 3 G: 1 INJECTION, POWDER, FOR SOLUTION INTRAMUSCULAR; INTRAVENOUS at 09:34:00

## 2022-10-06 NOTE — ANESTHESIA PROCEDURE NOTES
Airway  Date/Time: 10/6/2022 9:27 AM  Urgency: elective    Difficult airway    General Information and Staff    Patient location during procedure: OR  Anesthesiologist: Giuliano Connor MD  Performed: anesthesiologist     Indications and Patient Condition  Indications for airway management: anesthesia  Sedation level: deep  Preoxygenated: yes  Patient position: sniffing  Mask difficulty assessment: 1 - vent by mask  Planned trial extubation    Final Airway Details  Final airway type: endotracheal airway      Successful airway: ETT  Cuffed: yes   Successful intubation technique: Video laryngoscopy  Endotracheal tube insertion site: oral  Blade: GlideScope  Blade size: #4  ETT size (mm): 7.5    Cormack-Lehane Classification: grade IIB - view of arytenoids or posterior of glottis only  Placement verified by: chest auscultation and capnometry   Measured from: lips  ETT to lips (cm): 20  Number of attempts at approach: 1

## 2022-10-06 NOTE — ANESTHESIA PROCEDURE NOTES
Regional Block  Performed by: Yasir Velázquez MD  Authorized by: Yasir Velázquez MD       General Information and Staff    Start Time:  10/6/2022 9:30 AM  End Time:  10/6/2022 9:32 AM  Anesthesiologist:  Yasir Velázquez MD  Performed by: Anesthesiologist  Patient Location:  OR    Block Placement: Post Induction  Site Identification: real time ultrasound guided and image stored and retrievable    Block site/laterality marked before start: site marked  Reason for Block: at surgeon's request and post-op pain management    Preanesthetic Checklist: 2 patient identifers, IV checked, risks and benefits discussed, monitors and equipment checked, pre-op evaluation, timeout performed, anesthesia consent, sterile technique used, no prohibitive neurological deficits and no local skin infection at insertion site      Procedure Details    Patient Position:   Prep: ChloraPrep   Monitoring:  Cardiac monitor, continuous pulse ox and blood pressure cuff  Block Type: Adductor canal  Laterality:  Left  Injection Technique:  Single-shot    Needle    Needle Type:  Short-bevel and echogenic  Needle Gauge:  22 G  Needle Length:  110 mm  Needle Localization:  Ultrasound guidance  Reason for Ultrasound Use: appropriate spread of the medication was noted in real time and no ultrasound evidence of intravascular and/or intraneural injection            Assessment    Injection Assessment:  Good spread noted, negative resistance, negative aspiration for heme, incremental injection and low pressure  Heart Rate Change: No    - Patient tolerated block procedure well without evidence of immediate block related complications.      Medications      Additional Comments    Medication:  Ropivacaine 0.5% 25mL with 1:200,000 epi Dexamethasone preservative free 2 mg Buprenorphine 150 mcg for adductor canal block

## 2022-10-06 NOTE — BRIEF OP NOTE
Pre-Operative Diagnosis: OSTEOARTHRITIS LEFT KNEE     Post-Operative Diagnosis: OSTEOARTHRITIS LEFT KNEE      Procedure Performed:   LEFT TOTAL KNEE ARTHROPLASTY    Surgeon(s) and Role:     * Ck Leon MD - Primary    Assistant(s):  Surgical Assistant.: Jerrica Burroughs CSA  PA: Chad Dakin, PA-C     Surgical Findings: OA     Specimen: bone     Estimated Blood Loss: Blood Output: 20 mL (10/6/2022 10:32 AM)      Dictation Number:  Farhat Childers MD  10/6/2022  10:51 AM

## 2022-10-06 NOTE — PLAN OF CARE
Spouse at bedside. A&Ox4. VSS. On 2L O2 via nasal cannula. . Hx RUBEN w/ CPAP: telemetry monitoring - afib. Cardizem gtt infusing as ordered. SCDs on BLE. Ankle pumps encouraged. Reports nausea when arriving to floor, received IV Reglan - declining all PO medications at this time. Last BM 10/6. Due to void. Denies pain. Aquacel to left knee with spandagrip C/D/I, gel ice in place. Plan is for PT/OT eval. Patient and spouse updated on plan of care. Safety precautions in place. Call light within reach. Will continue to monitor.

## 2022-10-06 NOTE — OPERATIVE REPORT
PATIENT'S NAME: Rose Marie Swanson   ATTENDING PHYSICIAN: Sudhir Quesada MD   OPERATING PHYSICIAN: Sudhir Quesada MD   PATIENT ACCOUNT#:   [de-identified]    LOCATION:  [address] [place] [address],[address] RECORD #:   [id number]    YOB: 1955  ADMISSION DATE:  10/6/2022           OPERATION DATE:  10/6/2022     OPERATIVE REPORT     PREOPERATIVE DIAGNOSIS:  Left knee primary osteoarthritis. POSTOPERATIVE DIAGNOSIS: Left knee primary osteoarthritis. PROCEDURE PERFORMED:  Left total knee arthroplasty, kinematic alignment, Attune 7 cemented femur, cemented size 6 tibia, 5 mm rotating polyethylene, and cemented size 38 all-polyethylene patella. ASSISTANT:  KOBE Acosta    Skilled assistance was needed for patient positioning, prepping and draping, instrument holding and passing, retracting and suturing. ANESTHESIA:  Spinal with regional block. INDICATIONS:  This is a 74 yrs old [place] male who presents with ongoing pain and difficulties with the knee consistent with the above diagnosis with failed conservative care. I reviewed the indications and benefits of the procedure. The patient understood and agreed to proceed. PROCEDURE:  The patient was taken to the operating room and placed in the supine position after administration of spinal anesthesia. A regional block was performed at the lower extremity. A tourniquet was placed at the thigh, which later was inflated to 300 mmHg. A time-out was performed confirming the left knee as the appropriate operative site. The patient received IV antibiotics preoperatively. The knee was prepped and draped in the usual sterile fashion. A knife was used to make a longitudinal incision at the anterior aspect of the knee. The Bovie was used for subcutaneous dissection and hemostasis. Dissection was carried down to the extensor mechanism, which was incised in line with the quadriceps tendon, carried medial to patella and medial to patellar tendon.   Upon entering the joint, normal joint fluid was produced. Soft tissue dissection was carried out and osteophytes were removed until the joint was adequately visualized. Irrisept irrigation was placed in the wound and allowed to sit for 1 minute before evacuating the solution. The knee was then flexed and the drill used to gain intramedullary access to the femur. The intramedullary robert with the attached cutting guide was set to the appropriate valgus to accommodate the patient's femur per the preoperative templating, the guide was pinned in place and the distal femoral cut made. The caliper was used to confirm appropriate resection of the distal femur. Attention was turned to the tibia which was subluxed anteriorly. After adequate exposure, the extra medullary tibial guide was placed anterior to the leg. The posterior slope was determined, a pin was placed in the sliding hole of the guide. The alignment of the proximal tibia was then set, based on the preoperative templating, and the guide was pinned in place. The saw was then used to resect the proximal tibia, the caliper was used to evaluate the resection. The lollipop was then used to confirm extension gap at 5 mm. The sizing guide was then placed to the distal femur. The size was determined to be a 7, and the drill pins placed. The cutting guide was placed and pinned in position. The resection at the posterior femoral condyles was confirmed to be appropriate. The anterior and posterior femur cuts were made. The lollipop was used to confirm flexion gap was equal to the extension gap before finishing the chamfer cuts and removing the guide. The finishing guide was then placed and pinned in position. The notch cut was made before removing the guide. Attention was turned to the posterior aspect where the osteophytes were removed and soft tissue was released before turning attention back to the tibia.   The size 6 tibial trial was found to be an appropriate fit with good coverage and no overhang. The guide was pinned in place with the tower. The reamer was used followed by the punch, which was left in place for trialing. The femur was impacted in place, the trial polyethylene was placed. The  knee was brought into full extension with good flexion and good ligament balance throughout. Attention was turned to the patella. The caliper was used to measure initial thickness before using the saw in a freehand technique to resect the articular surface of the patella. A size 38 was the appropriate fit and this was placed. Drill holes were made for the patella, the trial was then placed and tracked nicely with the trial femur. The drill holes were then made for the trial femur before removing all trial implants. The bone was prepared for cementing with pulse lavage irrigation. A bone plug was placed to the distal femur. The final implants were opened on the back table and the cement was mixed. When the cement was ready, it was finger-packed into position at the proximal tibia before placing the cement-coated tibial implant, impacting it in place, and removing excess cement. The femur was placed with cement in the same fashion before placing the final polyethylene and bringing the knee into extension, placing the foot on the Abbott to compress the implants while the cement hardened. The patella was placed in the same fashion with cement. A clamp was used to hold it in place while the cement hardened. The knee was then copiously irrigated with pulse lavage irrigation before placing Irrisept into the wound and allowing it to sit for 1 minute before evacuating the solution. When the cement had hardened, the knee was flexed to confirm no further cement needed to be removed. The knee was put through good range of motion with good ligament balance and good patellar tracking. Closure was then performed.   A Quill suture was used in a running fashion to close the extensor mechanism. A #2 Orthocord suture was used in a figure-of-eight fashion at the medial parapatellar region to reinforce the repair. When the repair was complete, the knee was flexed to confirm stable repair. Subcutaneous tissue was then closed with inverted 2-0 Vicryl suture and the skin was closed with staples. A sterile dressing was placed. The patient tolerated the procedure well. There were no complications. Blood loss was approximately 20 mL. Tourniquet time was approximately 75 minutes. The patient was taken to the recovery room in stable condition.

## 2022-10-06 NOTE — INTERVAL H&P NOTE
Pre-op Diagnosis: OSTEOARTHRITIS LEFT KNEE    The above referenced H&P was reviewed by Tommy Humphrey MD on 10/6/2022, the patient was examined and no significant changes have occurred in the patient's condition since the H&P was performed. I discussed with the patient and/or legal representative the potential benefits, risks and side effects of this procedure; the likelihood of the patient achieving goals; and potential problems that might occur during recuperation. I discussed reasonable alternatives to the procedure, including risks, benefits and side effects related to the alternatives and risks related to not receiving this procedure. We will proceed with procedure as planned.

## 2022-10-07 VITALS
WEIGHT: 271.19 LBS | DIASTOLIC BLOOD PRESSURE: 76 MMHG | BODY MASS INDEX: 36.73 KG/M2 | SYSTOLIC BLOOD PRESSURE: 111 MMHG | HEIGHT: 72 IN | RESPIRATION RATE: 14 BRPM | HEART RATE: 90 BPM | OXYGEN SATURATION: 94 % | TEMPERATURE: 98 F

## 2022-10-07 LAB
ANION GAP SERPL CALC-SCNC: 7 MMOL/L (ref 0–18)
BUN BLD-MCNC: 42 MG/DL (ref 7–18)
CALCIUM BLD-MCNC: 9.3 MG/DL (ref 8.5–10.1)
CHLORIDE SERPL-SCNC: 102 MMOL/L (ref 98–112)
CO2 SERPL-SCNC: 23 MMOL/L (ref 21–32)
CREAT BLD-MCNC: 1.68 MG/DL
GFR SERPLBLD BASED ON 1.73 SQ M-ARVRAT: 44 ML/MIN/1.73M2 (ref 60–?)
GLUCOSE BLD-MCNC: 140 MG/DL (ref 70–99)
HCT VFR BLD AUTO: 37 %
HGB BLD-MCNC: 11.7 G/DL
OSMOLALITY SERPL CALC.SUM OF ELEC: 287 MOSM/KG (ref 275–295)
POTASSIUM SERPL-SCNC: 5.1 MMOL/L (ref 3.5–5.1)
SODIUM SERPL-SCNC: 132 MMOL/L (ref 136–145)

## 2022-10-07 PROCEDURE — 85014 HEMATOCRIT: CPT | Performed by: PHYSICIAN ASSISTANT

## 2022-10-07 PROCEDURE — 97166 OT EVAL MOD COMPLEX 45 MIN: CPT

## 2022-10-07 PROCEDURE — 80048 BASIC METABOLIC PNL TOTAL CA: CPT | Performed by: NURSE PRACTITIONER

## 2022-10-07 PROCEDURE — 97530 THERAPEUTIC ACTIVITIES: CPT

## 2022-10-07 PROCEDURE — 85018 HEMOGLOBIN: CPT | Performed by: PHYSICIAN ASSISTANT

## 2022-10-07 PROCEDURE — 97116 GAIT TRAINING THERAPY: CPT

## 2022-10-07 PROCEDURE — 97535 SELF CARE MNGMENT TRAINING: CPT

## 2022-10-07 RX ORDER — SPIRONOLACTONE 25 MG/1
25 TABLET ORAL DAILY
Status: DISCONTINUED | OUTPATIENT
Start: 2022-10-07 | End: 2022-10-07

## 2022-10-07 RX ORDER — FUROSEMIDE 20 MG/1
20 TABLET ORAL DAILY
Status: DISCONTINUED | OUTPATIENT
Start: 2022-10-07 | End: 2022-10-07

## 2022-10-07 RX ORDER — PSEUDOEPHEDRINE HCL 30 MG
100 TABLET ORAL 2 TIMES DAILY
Qty: 60 CAPSULE | Refills: 0 | Status: SHIPPED | COMMUNITY
Start: 2022-10-07

## 2022-10-07 NOTE — PROGRESS NOTES
States Celebrex, Ferrous Sulfate & Oxy IR were previously filled on 9/29/22. Explained to con't those meds as prescribed. Eliquis 2.5 mg po bid is ordered x 6 days then resume 5 mg dose as previously ordered. Verbalized understanding. AVS completed, Anoop Bell RN updated, will dc home w/ [facility] .

## 2022-10-07 NOTE — RESPIRATORY THERAPY NOTE
Patient has a history of RUBEN and wears CPAP at home, but he does not want to wear CPAP here. RUBEN protocol in place.

## 2022-10-07 NOTE — PLAN OF CARE
A&O x4. VSS on RA, RUBEN w cpap at night. Afib on tele. Cardizem gtt infusing at 10mL/hr. Mild pain to Lt knee, well controlled with PO pain medication. WBAT. Voids freely. BRP. Bilateral SCDs. Aquacel dressing C/D/I. Spandagrip in place. Gel ice applied. Denies N/V overnight. Reviewed POC, pain management, IS use, and fall precautions with pt. Bed alarm on w/bed in lowest position. Pt reminded to use call light. Verbalized understanding.

## 2023-02-07 ENCOUNTER — HOSPITAL ENCOUNTER (OUTPATIENT)
Dept: GENERAL RADIOLOGY | Age: 68
Discharge: HOME | End: 2023-02-07
Attending: PHYSICIAN ASSISTANT
Payer: COMMERCIAL

## 2023-02-07 DIAGNOSIS — J40 BRONCHITIS, NOT SPECIFIED AS ACUTE OR CHRONIC: ICD-10-CM

## 2023-02-07 PROCEDURE — 71046 X-RAY EXAM CHEST 2 VIEWS: CPT | Performed by: PHYSICIAN ASSISTANT

## 2023-12-11 ENCOUNTER — OFFICE VISIT (OUTPATIENT)
Facility: LOCATION | Age: 68
End: 2023-12-11
Payer: COMMERCIAL

## 2023-12-11 DIAGNOSIS — R04.0 EPISTAXIS: Primary | ICD-10-CM

## 2023-12-11 NOTE — PROGRESS NOTES
Asha Gil is a 77year old male. No chief complaint on file. HPI:   68-year-old white male who is on Eliquis has had recurrent right-sided epistaxis. Never had his nose cauterized. No history of trauma previous nasal surgeries. He does have sleep apnea wears CPAP. Current Outpatient Medications   Medication Sig Dispense Refill    docusate sodium 100 MG Oral Cap Take 100 mg by mouth 2 (two) times daily. 60 capsule 0    furosemide 20 MG Oral Tab Take 20 mg by mouth daily. metoprolol succinate 50 MG Oral Tablet 24 Hr Take 3 tablets (150 mg total) by mouth 2 (two) times a day. Dose increase. 540 tablet 1    dilTIAZem HCl ER (DILT-XR) 240 MG Oral Capsule SR 24 Hr Take 1 capsule (240 mg total) by mouth daily. 90 capsule 3    Irbesartan 75 MG Oral Tab Take 1 tablet (75 mg total) by mouth daily. 90 tablet 3    spironolactone 25 MG Oral Tab Take 1 tablet (25 mg total) by mouth daily. 90 tablet 3      Past Medical History:   Diagnosis Date    Arrhythmia 2005    A-Fib diagnosed in 2003    Cardiomyopathy [facility])     Essential hypertension     HIGH BLOOD PRESSURE     Hx of motion sickness     Inguinal hernia unilateral, non-recurrent 7/1/2013    RUBEN (obstructive sleep apnea) 3/7/17    AHI 68 (mostly central apneas)    Osteoarthritis     left knee , a little in hips    rt total knee replacement- global thru 5/27/14 2/28/2014      Social History:  Social History     Socioeconomic History    Marital status:    Tobacco Use    Smoking status: Never    Smokeless tobacco: Never   Vaping Use    Vaping Use: Never used   Substance and Sexual Activity    Alcohol use: Yes     Comment: rare    Drug use: No      Past Surgical History:   Procedure Laterality Date    INGUINAL HERNIA REPAIR  7/1/2013    Procedure:  HERNIA INGUINAL REPAIR ADULT;  Surgeon: Juni Daniel MD;  Location: [facility] MAIN OR    KNEE REPLACEMENT SURGERY  02/26/2014    RTKA Per Dr. Cathy Marsh 2014    ORTHOPEDIC SURG ([facility])      left knee scope    ORTHOPEDIC SURG ([facility])  2007    left achilles tendon    OTHER SURGICAL HISTORY      right and left knee x2 surgeries- arthroscopic 2011 & 2010         REVIEW OF SYSTEMS:   GENERAL HEALTH: feels well otherwise  GENERAL : denies fever, chills, sweats, weight loss, weight gain  SKIN: denies any unusual skin lesions or rashes  RESPIRATORY: denies shortness of breath with exertion  NEURO: denies headaches    EXAM:   There were no vitals taken for this visit. System Pertinent findings Details   Constitutional  Overall appearance - Normal.   Psychiatric  Orientation - Oriented to time, place, person & situation. Appropriate mood and affect. Head/Face  Facial features -- Normal. Skull - Normal.   Eyes  Pupils equal ,round ,react to light and accomidate   Ears  External Ear Right: Normal, Left: Normal. Canal - Right: Normal, Left: Normal. TM - Right: Normal left: Normal   Nose  External Nose, Normal, Septum -midline however area on the right anterior septum suspicious for bleeding site nasal Vault, clear no masses,Turbinates - Right: Normal left: Normal   Mouth/Throat  Lips/teeth/gums - Normal. Tonsils -1+. Oropharynx - Normal.   Neck Exam  Inspection - Normal. Palpation - Normal. Parotid gland - Normal. Thyroid gland -normal   Neurological  Cranial nerves - Cranial nerves II through XII grossly intact. Nasopharynx   Normal        Lymph Detail  Submental. Submandibular. Anterior cervical. Posterior cervical. Supraclavicular. Patient in for epistaxis requiring cauterization. Procedure: Patient was topically anesthetized in the usual fashion. Using silver nitrate sticks the right anterior septum was cauterized in the usual fashion. Patient tolerated the procedure well. Given routine post cauterization instructions. And will follow-up as needed. ASSESSMENT AND PLAN:   1. Epistaxis  Right anterior septum was cauterized, routine post cauterization instructions given.       The patient indicates understanding of these issues and agrees to the plan.       Kaiden Moreno MD  12/11/2023  4:42 PM

## 2023-12-12 ENCOUNTER — HOSPITAL ENCOUNTER (OUTPATIENT)
Dept: CV DIAGNOSTICS | Facility: HOSPITAL | Age: 68
Discharge: HOME | End: 2023-12-12
Attending: INTERNAL MEDICINE
Payer: COMMERCIAL

## 2023-12-12 DIAGNOSIS — I42.8 NONISCHEMIC CARDIOMYOPATHY (HCC): ICD-10-CM

## 2023-12-12 DIAGNOSIS — I10 ESSENTIAL HYPERTENSION: ICD-10-CM

## 2023-12-12 DIAGNOSIS — Z79.01 ANTICOAGULATED ON COUMADIN: ICD-10-CM

## 2023-12-12 DIAGNOSIS — I36.1 NON-RHEUMATIC TRICUSPID VALVE INSUFFICIENCY: ICD-10-CM

## 2023-12-12 DIAGNOSIS — I34.0 NONRHEUMATIC MITRAL VALVE REGURGITATION: ICD-10-CM

## 2023-12-12 DIAGNOSIS — R94.30 CARDIAC LV EJECTION FRACTION <20%: ICD-10-CM

## 2023-12-12 DIAGNOSIS — I48.20 CHRONIC ATRIAL FIBRILLATION (HCC): ICD-10-CM

## 2023-12-12 DIAGNOSIS — I50.21 ACUTE SYSTOLIC CHF (CONGESTIVE HEART FAILURE) (HCC): ICD-10-CM

## 2023-12-12 DIAGNOSIS — I25.10 CORONARY ARTERY DISEASE INVOLVING NATIVE CORONARY ARTERY OF NATIVE HEART WITHOUT ANGINA PECTORIS: ICD-10-CM

## 2023-12-12 PROCEDURE — 78452 HT MUSCLE IMAGE SPECT MULT: CPT | Performed by: INTERNAL MEDICINE

## 2023-12-12 PROCEDURE — 93018 CV STRESS TEST I&R ONLY: CPT | Performed by: INTERNAL MEDICINE

## 2023-12-12 PROCEDURE — 93017 CV STRESS TEST TRACING ONLY: CPT | Performed by: INTERNAL MEDICINE

## 2024-08-29 ENCOUNTER — OFFICE VISIT (OUTPATIENT)
Facility: LOCATION | Age: 69
End: 2024-08-29
Payer: OTHER MISCELLANEOUS

## 2024-08-29 VITALS
TEMPERATURE: 97 F | HEIGHT: 72 IN | HEART RATE: 89 BPM | BODY MASS INDEX: 36.7 KG/M2 | OXYGEN SATURATION: 99 % | RESPIRATION RATE: 18 BRPM | WEIGHT: 271 LBS

## 2024-08-29 DIAGNOSIS — K43.9 VENTRAL HERNIA WITHOUT OBSTRUCTION OR GANGRENE: Primary | ICD-10-CM

## 2024-08-29 PROCEDURE — 99205 OFFICE O/P NEW HI 60 MIN: CPT | Performed by: STUDENT IN AN ORGANIZED HEALTH CARE EDUCATION/TRAINING PROGRAM

## 2024-08-29 NOTE — H&P
New Patient Visit Note       Active Problems      1. Ventral hernia without obstruction or gangrene        Chief Complaint   Chief Complaint   Patient presents with    Hernia      NP-W/C-Umbilical hernia patient states he was injured June 19th has bulge near belly button        History of Present Illness   68 year old male who is here for evaluation of umbilical hernia. Patient reports noting this in June. He was lifting heavy car parts at work when he felt a tear and muscle pull in his umbilicus. He was evaluated by his primary physician who diagnosed an umbilical hernia. He reports pain and discomfort at the umbilicus. This is worsened with activity. He initially had some constipation but this has mostly resolved with dietary changes. He denies nausea or vomiting. Denies abdominal surgery in the past. He denies tobacco smoking. He is on Eliquis for a fib.       Allergies  Varun is allergic to azithromycin, penicillins, cephalexin, methylprednisolone, and augmentin [amoxicillin-pot clavulanate].    Past Medical / Surgical / Social / Family History    The past medical and past surgical history have been reviewed by me today.    Past Medical History:    Arrhythmia    A-Fib diagnosed in 2003    Cardiomyopathy ([facility])    Essential hypertension    HIGH BLOOD PRESSURE    Hx of motion sickness    Inguinal hernia unilateral, non-recurrent    RUBEN (obstructive sleep apnea)    AHI 68 (mostly central apneas)    Osteoarthritis    left knee , a little in hips    rt total knee replacement- global thru 5/27/14      Past Surgical History:   Procedure Laterality Date    Inguinal hernia repair  7/1/2013    Procedure: HERNIA INGUINAL REPAIR ADULT;  Surgeon: Darshan Vanegas MD;  Location:  MAIN OR    Knee replacement surgery  02/26/2014    RTKA Per Dr. Carroll 2014    Orthopedic surg ([facility]bp)      left knee scope    Orthopedic surg ([facility]bp)  2007    left achilles tendon    Other surgical history      right and left knee x2 surgeries-  arthroscopic 2011 & 2010       The family history and social history have been reviewed by me today.    Family History   Problem Relation Age of Onset    Heart Disorder Father      Social History     Socioeconomic History    Marital status:    Tobacco Use    Smoking status: Never    Smokeless tobacco: Never   Vaping Use    Vaping status: Never Used   Substance and Sexual Activity    Alcohol use: Yes     Comment: rare    Drug use: No        Current Outpatient Medications:     apixaban 5 MG Oral Tab, Take 1 tablet (5 mg total) by mouth 2 (two) times daily., Disp: , Rfl:     furosemide 20 MG Oral Tab, Take 1 tablet (20 mg total) by mouth daily., Disp: , Rfl:     metoprolol succinate 50 MG Oral Tablet 24 Hr, Take 3 tablets (150 mg total) by mouth 2 (two) times a day. Dose increase., Disp: 540 tablet, Rfl: 1    dilTIAZem HCl ER (DILT-XR) 240 MG Oral Capsule SR 24 Hr, Take 1 capsule (240 mg total) by mouth daily., Disp: 90 capsule, Rfl: 3    Irbesartan 75 MG Oral Tab, Take 1 tablet (75 mg total) by mouth daily., Disp: 90 tablet, Rfl: 3    spironolactone 25 MG Oral Tab, Take 1 tablet (25 mg total) by mouth daily., Disp: 90 tablet, Rfl: 3      Review of Systems  The Review of Systems has been reviewed by me during today.  Review of Systems   Constitutional:  Negative for chills, diaphoresis, fatigue and fever.   HENT:  Negative for ear discharge, ear pain and sore throat.    Eyes:  Negative for pain and discharge.   Respiratory:  Negative for cough, chest tightness and shortness of breath.    Cardiovascular:  Negative for chest pain, palpitations and leg swelling.   Gastrointestinal:  Positive for abdominal pain. Negative for abdominal distention, blood in stool, constipation, diarrhea, nausea and vomiting.   Genitourinary:  Negative for dysuria, frequency, hematuria and urgency.   Skin:  Negative for color change, pallor and rash.   Neurological:  Negative for weakness, light-headedness, numbness and headaches.    Hematological:  Negative for adenopathy. Does not bruise/bleed easily.   Psychiatric/Behavioral:  Negative for agitation and confusion.        Physical Findings   Pulse 89   Temp 96.8 °F (36 °C)   Resp 18   Ht 72\"   Wt 271 lb (122.9 kg)   SpO2 99%   BMI 36.75 kg/m²   Physical Exam  Constitutional:       Appearance: Normal appearance.   HENT:      Head: Normocephalic and atraumatic.   Cardiovascular:      Pulses: Normal pulses.   Pulmonary:      Effort: Pulmonary effort is normal.   Abdominal:      General: Abdomen is flat.      Palpations: Abdomen is soft.      Tenderness: There is abdominal tenderness in the periumbilical area. There is no guarding or rebound.      Hernia: A hernia is present. Hernia is present in the ventral area.   Skin:     General: Skin is warm.      Capillary Refill: Capillary refill takes less than 2 seconds.   Neurological:      Mental Status: He is alert and oriented to person, place, and time. Mental status is at baseline.             Assessment/Plan  1. Ventral hernia without obstruction or gangrene        Varun Melendez is a 68 year old male referred by Varun Romero MD for evaluation of ventral hernia.  He has a ventral hernia in the infraumbilical position. This is reducible on exam. It is tender and symptomatic . I discussed with the patient the treatment options. I recommend proceeding with robotic repair of the ventral hernia. The risks and benefits were reviewed with the patient and all questions were answered.          Yvan Almeida MD

## 2024-09-03 PROBLEM — I10 HYPERTENSION: Status: ACTIVE | Noted: 2024-09-03

## 2024-09-03 PROBLEM — M25.672 STIFFNESS OF ANKLE JOINT, LEFT: Status: ACTIVE | Noted: 2023-10-17

## 2024-09-03 PROBLEM — M25.552 LEFT HIP PAIN: Status: ACTIVE | Noted: 2022-08-12

## 2024-09-03 PROBLEM — M16.11 PRIMARY OSTEOARTHRITIS OF RIGHT HIP: Status: ACTIVE | Noted: 2022-08-12

## 2024-09-03 PROBLEM — R89.9 ABNORMAL LABORATORY TEST RESULT: Status: ACTIVE | Noted: 2024-09-03

## 2024-09-03 PROBLEM — M94.272 CHONDROMALACIA OF LEFT ANKLE: Status: ACTIVE | Noted: 2023-10-17

## 2024-09-03 PROBLEM — E55.9 VITAMIN D DEFICIENCY: Status: ACTIVE | Noted: 2024-09-03

## 2024-09-03 PROBLEM — M25.551 RIGHT HIP PAIN: Status: ACTIVE | Noted: 2022-08-12

## 2024-09-03 PROBLEM — D72.829 LEUKOCYTOSIS: Status: ACTIVE | Noted: 2024-09-03

## 2024-09-03 PROBLEM — J32.9 SINUSITIS: Status: ACTIVE | Noted: 2024-09-03

## 2024-09-04 ENCOUNTER — LABORATORY ENCOUNTER (OUTPATIENT)
Dept: LAB | Age: 69
End: 2024-09-04
Attending: STUDENT IN AN ORGANIZED HEALTH CARE EDUCATION/TRAINING PROGRAM
Payer: OTHER MISCELLANEOUS

## 2024-09-04 DIAGNOSIS — Z01.818 PRE-OP TESTING: ICD-10-CM

## 2024-09-04 LAB
ANION GAP SERPL CALC-SCNC: 15 MMOL/L (ref 0–18)
BUN BLD-MCNC: 22 MG/DL (ref 9–23)
CALCIUM BLD-MCNC: 10.3 MG/DL (ref 8.7–10.4)
CHLORIDE SERPL-SCNC: 104 MMOL/L (ref 98–112)
CO2 SERPL-SCNC: 16 MMOL/L (ref 21–32)
CREAT BLD-MCNC: 1.26 MG/DL
EGFRCR SERPLBLD CKD-EPI 2021: 61 ML/MIN/1.73M2 (ref 60–?)
FASTING STATUS PATIENT QL REPORTED: YES
GLUCOSE BLD-MCNC: 109 MG/DL (ref 70–99)
OSMOLALITY SERPL CALC.SUM OF ELEC: 284 MOSM/KG (ref 275–295)
POTASSIUM SERPL-SCNC: 4.6 MMOL/L (ref 3.5–5.1)
SODIUM SERPL-SCNC: 135 MMOL/L (ref 136–145)

## 2024-09-04 PROCEDURE — 36415 COLL VENOUS BLD VENIPUNCTURE: CPT

## 2024-09-04 PROCEDURE — 80048 BASIC METABOLIC PNL TOTAL CA: CPT

## 2024-09-05 ENCOUNTER — ANESTHESIA EVENT (OUTPATIENT)
Dept: SURGERY | Facility: HOSPITAL | Age: 69
End: 2024-09-05
Payer: OTHER MISCELLANEOUS

## 2024-09-06 ENCOUNTER — HOSPITAL ENCOUNTER (OUTPATIENT)
Facility: HOSPITAL | Age: 69
Setting detail: HOSPITAL OUTPATIENT SURGERY
Discharge: HOME | End: 2024-09-06
Attending: STUDENT IN AN ORGANIZED HEALTH CARE EDUCATION/TRAINING PROGRAM | Admitting: STUDENT IN AN ORGANIZED HEALTH CARE EDUCATION/TRAINING PROGRAM
Payer: OTHER MISCELLANEOUS

## 2024-09-06 ENCOUNTER — ANESTHESIA (OUTPATIENT)
Dept: SURGERY | Facility: HOSPITAL | Age: 69
End: 2024-09-06
Payer: OTHER MISCELLANEOUS

## 2024-09-06 VITALS
HEART RATE: 105 BPM | WEIGHT: 273.13 LBS | RESPIRATION RATE: 18 BRPM | TEMPERATURE: 98 F | SYSTOLIC BLOOD PRESSURE: 143 MMHG | BODY MASS INDEX: 36.99 KG/M2 | HEIGHT: 72 IN | OXYGEN SATURATION: 93 % | DIASTOLIC BLOOD PRESSURE: 93 MMHG

## 2024-09-06 DIAGNOSIS — Z01.818 PRE-OP TESTING: ICD-10-CM

## 2024-09-06 DIAGNOSIS — K42.9 UMBILICAL HERNIA WITHOUT OBSTRUCTION AND WITHOUT GANGRENE: Primary | ICD-10-CM

## 2024-09-06 PROCEDURE — 0WUF4JZ SUPPLEMENT ABDOMINAL WALL WITH SYNTHETIC SUBSTITUTE, PERCUTANEOUS ENDOSCOPIC APPROACH: ICD-10-PCS | Performed by: STUDENT IN AN ORGANIZED HEALTH CARE EDUCATION/TRAINING PROGRAM

## 2024-09-06 PROCEDURE — 8E0W4CZ ROBOTIC ASSISTED PROCEDURE OF TRUNK REGION, PERCUTANEOUS ENDOSCOPIC APPROACH: ICD-10-PCS | Performed by: STUDENT IN AN ORGANIZED HEALTH CARE EDUCATION/TRAINING PROGRAM

## 2024-09-06 RX ORDER — HYDROCODONE BITARTRATE AND ACETAMINOPHEN 5; 325 MG/1; MG/1
2 TABLET ORAL ONCE AS NEEDED
Status: DISCONTINUED | OUTPATIENT
Start: 2024-09-06 | End: 2024-09-06

## 2024-09-06 RX ORDER — SODIUM CHLORIDE, SODIUM LACTATE, POTASSIUM CHLORIDE, CALCIUM CHLORIDE 600; 310; 30; 20 MG/100ML; MG/100ML; MG/100ML; MG/100ML
INJECTION, SOLUTION INTRAVENOUS CONTINUOUS
Status: DISCONTINUED | OUTPATIENT
Start: 2024-09-06 | End: 2024-09-06

## 2024-09-06 RX ORDER — HYDROMORPHONE HYDROCHLORIDE 1 MG/ML
INJECTION, SOLUTION INTRAMUSCULAR; INTRAVENOUS; SUBCUTANEOUS
Status: COMPLETED
Start: 2024-09-06 | End: 2024-09-06

## 2024-09-06 RX ORDER — HEPARIN SODIUM 5000 [USP'U]/ML
5000 INJECTION, SOLUTION INTRAVENOUS; SUBCUTANEOUS ONCE
Status: COMPLETED | OUTPATIENT
Start: 2024-09-06 | End: 2024-09-06

## 2024-09-06 RX ORDER — LABETALOL HYDROCHLORIDE 5 MG/ML
INJECTION, SOLUTION INTRAVENOUS
Status: COMPLETED
Start: 2024-09-06 | End: 2024-09-06

## 2024-09-06 RX ORDER — HYDROMORPHONE HYDROCHLORIDE 1 MG/ML
0.4 INJECTION, SOLUTION INTRAMUSCULAR; INTRAVENOUS; SUBCUTANEOUS EVERY 5 MIN PRN
OUTPATIENT
Start: 2024-09-06 | End: 2024-09-07

## 2024-09-06 RX ORDER — HYDROMORPHONE HYDROCHLORIDE 1 MG/ML
0.6 INJECTION, SOLUTION INTRAMUSCULAR; INTRAVENOUS; SUBCUTANEOUS EVERY 5 MIN PRN
OUTPATIENT
Start: 2024-09-06 | End: 2024-09-07

## 2024-09-06 RX ORDER — LIDOCAINE HYDROCHLORIDE 10 MG/ML
INJECTION, SOLUTION EPIDURAL; INFILTRATION; INTRACAUDAL; PERINEURAL AS NEEDED
Status: DISCONTINUED | OUTPATIENT
Start: 2024-09-06 | End: 2024-09-06 | Stop reason: SURG

## 2024-09-06 RX ORDER — HYDROCODONE BITARTRATE AND ACETAMINOPHEN 5; 325 MG/1; MG/1
1 TABLET ORAL ONCE AS NEEDED
OUTPATIENT
Start: 2024-09-06 | End: 2024-09-06

## 2024-09-06 RX ORDER — KETOROLAC TROMETHAMINE 30 MG/ML
15 INJECTION, SOLUTION INTRAMUSCULAR; INTRAVENOUS ONCE
OUTPATIENT
Start: 2024-09-06 | End: 2024-09-06

## 2024-09-06 RX ORDER — ACETAMINOPHEN 500 MG
1000 TABLET ORAL ONCE
Status: DISCONTINUED | OUTPATIENT
Start: 2024-09-06 | End: 2024-09-06 | Stop reason: HOSPADM

## 2024-09-06 RX ORDER — HYDROCODONE BITARTRATE AND ACETAMINOPHEN 5; 325 MG/1; MG/1
2 TABLET ORAL ONCE AS NEEDED
OUTPATIENT
Start: 2024-09-06 | End: 2024-09-06

## 2024-09-06 RX ORDER — LIDOCAINE HYDROCHLORIDE 40 MG/ML
SOLUTION TOPICAL AS NEEDED
Status: DISCONTINUED | OUTPATIENT
Start: 2024-09-06 | End: 2024-09-06 | Stop reason: SURG

## 2024-09-06 RX ORDER — HYDROMORPHONE HYDROCHLORIDE 1 MG/ML
0.6 INJECTION, SOLUTION INTRAMUSCULAR; INTRAVENOUS; SUBCUTANEOUS EVERY 5 MIN PRN
Status: DISCONTINUED | OUTPATIENT
Start: 2024-09-06 | End: 2024-09-06

## 2024-09-06 RX ORDER — ONDANSETRON 2 MG/ML
4 INJECTION INTRAMUSCULAR; INTRAVENOUS EVERY 6 HOURS PRN
Status: DISCONTINUED | OUTPATIENT
Start: 2024-09-06 | End: 2024-09-06

## 2024-09-06 RX ORDER — HYDROMORPHONE HYDROCHLORIDE 1 MG/ML
0.4 INJECTION, SOLUTION INTRAMUSCULAR; INTRAVENOUS; SUBCUTANEOUS EVERY 5 MIN PRN
Status: DISCONTINUED | OUTPATIENT
Start: 2024-09-06 | End: 2024-09-06

## 2024-09-06 RX ORDER — HYDROMORPHONE HYDROCHLORIDE 1 MG/ML
0.2 INJECTION, SOLUTION INTRAMUSCULAR; INTRAVENOUS; SUBCUTANEOUS EVERY 5 MIN PRN
Status: DISCONTINUED | OUTPATIENT
Start: 2024-09-06 | End: 2024-09-06

## 2024-09-06 RX ORDER — ONDANSETRON 2 MG/ML
4 INJECTION INTRAMUSCULAR; INTRAVENOUS EVERY 6 HOURS PRN
OUTPATIENT
Start: 2024-09-06

## 2024-09-06 RX ORDER — ACETAMINOPHEN 500 MG
1000 TABLET ORAL ONCE AS NEEDED
Status: DISCONTINUED | OUTPATIENT
Start: 2024-09-06 | End: 2024-09-06

## 2024-09-06 RX ORDER — VANCOMYCIN 1.75 GRAM/500 ML IN 0.9 % SODIUM CHLORIDE INTRAVENOUS
15 ONCE
Status: DISCONTINUED | OUTPATIENT
Start: 2024-09-06 | End: 2024-09-06 | Stop reason: HOSPADM

## 2024-09-06 RX ORDER — ONDANSETRON 2 MG/ML
INJECTION INTRAMUSCULAR; INTRAVENOUS AS NEEDED
Status: DISCONTINUED | OUTPATIENT
Start: 2024-09-06 | End: 2024-09-06 | Stop reason: SURG

## 2024-09-06 RX ORDER — ACETAMINOPHEN 500 MG
1000 TABLET ORAL ONCE AS NEEDED
OUTPATIENT
Start: 2024-09-06 | End: 2024-09-06

## 2024-09-06 RX ORDER — BUPIVACAINE HYDROCHLORIDE 2.5 MG/ML
INJECTION, SOLUTION EPIDURAL; INFILTRATION; INTRACAUDAL AS NEEDED
Status: DISCONTINUED | OUTPATIENT
Start: 2024-09-06 | End: 2024-09-06 | Stop reason: HOSPADM

## 2024-09-06 RX ORDER — HYDROMORPHONE HYDROCHLORIDE 1 MG/ML
0.2 INJECTION, SOLUTION INTRAMUSCULAR; INTRAVENOUS; SUBCUTANEOUS EVERY 5 MIN PRN
OUTPATIENT
Start: 2024-09-06 | End: 2024-09-07

## 2024-09-06 RX ORDER — SODIUM CHLORIDE, SODIUM LACTATE, POTASSIUM CHLORIDE, CALCIUM CHLORIDE 600; 310; 30; 20 MG/100ML; MG/100ML; MG/100ML; MG/100ML
INJECTION, SOLUTION INTRAVENOUS CONTINUOUS
OUTPATIENT
Start: 2024-09-06

## 2024-09-06 RX ORDER — KETOROLAC TROMETHAMINE 30 MG/ML
INJECTION, SOLUTION INTRAMUSCULAR; INTRAVENOUS AS NEEDED
Status: DISCONTINUED | OUTPATIENT
Start: 2024-09-06 | End: 2024-09-06 | Stop reason: SURG

## 2024-09-06 RX ORDER — OXYCODONE HYDROCHLORIDE 5 MG/1
5 TABLET ORAL EVERY 6 HOURS PRN
Qty: 20 TABLET | Refills: 0 | Status: SHIPPED | OUTPATIENT
Start: 2024-09-06

## 2024-09-06 RX ORDER — ROCURONIUM BROMIDE 10 MG/ML
INJECTION, SOLUTION INTRAVENOUS AS NEEDED
Status: DISCONTINUED | OUTPATIENT
Start: 2024-09-06 | End: 2024-09-06 | Stop reason: SURG

## 2024-09-06 RX ORDER — HYDROCODONE BITARTRATE AND ACETAMINOPHEN 5; 325 MG/1; MG/1
1 TABLET ORAL ONCE AS NEEDED
Status: DISCONTINUED | OUTPATIENT
Start: 2024-09-06 | End: 2024-09-06

## 2024-09-06 RX ORDER — DEXAMETHASONE SODIUM PHOSPHATE 4 MG/ML
VIAL (ML) INJECTION AS NEEDED
Status: DISCONTINUED | OUTPATIENT
Start: 2024-09-06 | End: 2024-09-06 | Stop reason: SURG

## 2024-09-06 RX ORDER — LABETALOL HYDROCHLORIDE 5 MG/ML
5 INJECTION, SOLUTION INTRAVENOUS EVERY 5 MIN PRN
Status: DISCONTINUED | OUTPATIENT
Start: 2024-09-06 | End: 2024-09-06

## 2024-09-06 RX ORDER — METOCLOPRAMIDE HYDROCHLORIDE 5 MG/ML
5 INJECTION INTRAMUSCULAR; INTRAVENOUS EVERY 8 HOURS PRN
Status: DISCONTINUED | OUTPATIENT
Start: 2024-09-06 | End: 2024-09-06

## 2024-09-06 RX ORDER — NALOXONE HYDROCHLORIDE 0.4 MG/ML
0.08 INJECTION, SOLUTION INTRAMUSCULAR; INTRAVENOUS; SUBCUTANEOUS AS NEEDED
Status: DISCONTINUED | OUTPATIENT
Start: 2024-09-06 | End: 2024-09-06

## 2024-09-06 RX ADMIN — KETOROLAC TROMETHAMINE 15 MG: 30 INJECTION, SOLUTION INTRAMUSCULAR; INTRAVENOUS at 18:09:00

## 2024-09-06 RX ADMIN — LIDOCAINE HYDROCHLORIDE 4 ML: 40 SOLUTION TOPICAL at 16:16:00

## 2024-09-06 RX ADMIN — DEXAMETHASONE SODIUM PHOSPHATE 8 MG: 4 MG/ML VIAL (ML) INJECTION at 16:20:00

## 2024-09-06 RX ADMIN — SODIUM CHLORIDE, SODIUM LACTATE, POTASSIUM CHLORIDE, CALCIUM CHLORIDE: 600; 310; 30; 20 INJECTION, SOLUTION INTRAVENOUS at 16:11:00

## 2024-09-06 RX ADMIN — ONDANSETRON 4 MG: 2 INJECTION INTRAMUSCULAR; INTRAVENOUS at 18:09:00

## 2024-09-06 RX ADMIN — ROCURONIUM BROMIDE 50 MG: 10 INJECTION, SOLUTION INTRAVENOUS at 16:14:00

## 2024-09-06 RX ADMIN — LIDOCAINE HYDROCHLORIDE 50 MG: 10 INJECTION, SOLUTION EPIDURAL; INFILTRATION; INTRACAUDAL; PERINEURAL at 16:14:00

## 2024-09-06 RX ADMIN — SODIUM CHLORIDE, SODIUM LACTATE, POTASSIUM CHLORIDE, CALCIUM CHLORIDE: 600; 310; 30; 20 INJECTION, SOLUTION INTRAVENOUS at 18:18:00

## 2024-09-06 NOTE — OPERATIVE REPORT
[facility]  Operative Note    Varun Melendez Location: OR   [facility] [id number] MRN [id number]    1955 Age 68 year old   Admission Date 2024 Operation Date 2024   Attending Physician Yvan Almeida MD Operating Physician Yvan Almeida MD   PCP Varun Romero MD          Patient Name: Varun Melendez    Preoperative Diagnosis: Ventral hernia without obstruction or gangrene [K43.9]    Postoperative Diagnosis: Same as pre-op diagnosis.    Primary Surgeon: Yvan Almeida MD     Assistant: Francisco Sr SA    The assistance of Francisco Sr SA was absolutely essential to the proper conduct of this case and further assisted with bed side assisting with the da patty robot.       Anesthesia: General    Anesthesiologist: Anesthesiologist.: Jose Elias Atkinson MD; Wolfgang Bourne MD  CRNA.: Martin Garcia CRNA    Procedures: robotic ventral hernia repair    Implants: none     Specimen: none     Drains: none    Estimated Blood Loss: Blood Output: 5 mL (2024  6:01 PM)       Complications: None    Condition: Good    Indications for Surgery:   Varun Melendez is a 68 year old male who presents with a painful enlarging bulge in the umbilicus. Physical exam showed an umbilical hernia . The patient presents today for elective hernia repair.    Surgical Findings:   1.5 cm incarcerated umbilical hernia     Description of Procedure:   The patient was transported to the operating room and placed on the operating table in supine position. General endotracheal anesthesia was administered. The abdomen was prepped and draped in sterile fashion. Pre-operative antibiotics were given. A time-out was performed.  A stab incision was made in the left upper quadrant. The Veress needle was passed into the peritoneal cavity and pneumoperitoneum was achieved to a pressure of 15 mmHg.   In the left upper quadrant an 8 mm incision was made and 8 mm Optical port was placed with direct visualization of the abdominal wall layers.  Initial diagnostic survey reveals no injury secondary to our entry. The hernia is noted with incarcerated omentum. No other acute pathology is identified. two 8 mm trocars were placed under direct visualization; one in the left mid abdomen out laterally, one in the left lower quadrant just off the ASIS  The robot was docked and instruments were placed under direct vision.     The hernia contents are reduced. An incision is made in the peritoneum and a combination of blunt dissection and electrocautery are used to dissect a preperitoneal pocket. Dissection was taken towards the hernia. The hernia sac was carefully dissected out of th defect, taking care to avoid creating defects in the peritoneum. The preperitoneal pocket is extended cephalad, caudad, and contralaterally to accommodate a mesh. Pneumoperitoneum is reduced to 12 mmHg. The hernia defect is closed primarily with running #0 V Loc suture with good approximation.  The mesh is initially approximated to the abdominal wall with the initial fascia closure stitch to ensure appropriate positioning. 2-0 V Loc suture is used to close the peritoneum flap.    All needles are recovered from the abdomen. The instruments were removed and pneumoperitoneum was released. All wounds were cleansed and irrigated and the skin is reapproximated using 4-0 Monocryl in subcuticular fashion. Local anesthetic was injected at all incision sites which were then covered with skin glue. The drapes were removed and the patient was awakened from anesthesia. The patient was transported to the recovery room in good condition, having tolerated the procedure well without apparent intraoperative complication. All needle, sponge, instrument counts were correct at the end of procedure.     Yvan Almeida MD   9/6/2024  6:24 PM

## 2024-09-06 NOTE — DISCHARGE INSTRUCTIONS
Home Care Instructions  Robot-Assisted Laparoscopic Ventral Hernia Repair  Dr. Yvan Almeida    WHAT TO EXPECT  You may feel pain at the incisions or where your hernia used to be. This is due to stitches placed during the surgery.    You may feel pain in the shoulders. This is due to irritation of the diaphragm by the air used to inflate the abdomen.    You may feel a sore throat. This is due to the breathing tube used during surgery.     You may feel mild nausea and vomiting for the first 24 hours, this should resolve quickly.    You may have constipation, especially if taking narcotic pain medications. If you have not had a bowel movement by 48 hours after surgery, take Miralax 17g (one cap full) every 12 hours until you have a bowel movement. If another 24 hours goes by without a bowel movement, then take a dose of magnesium citrate or milk of magnesia.     MEDICATIONS  Take 2 Extra Strength Tylenol (1000mg every) 8 hours for pain. For the first 3 days it is best to take the Tylenol every 8 hours even if you do not feel much pain.     For moderate to severe pain take one Oxycodone pill (5mg) every six hours as needed for pain. If you do not feel that narcotics are necessary you shouldn’t take them. If the pain is severe you can take two pills (10mg) every six hours.    Take Toradol (10mg) every 6 hours for the first 3 days .    After you finish the Toradol, you can take Advil (ibuprofen) 800mg every 8 hours or Alleve (naproxen) 500mg every 12 hours.     Please ask your surgeon before resuming blood thinners such as Aspirin, Plavix, Coumadin, Warfarin, Eliquis, or Xarelto. All other home medications may be resumed as scheduled.     DIET  Start with a light and bland diet and slowly advance to regular food as your appetite improves. There are no specific food restrictions. Do not eat excessively. Eat small frequent meals.     Drink plenty of water. Try to eat a healthy high fiber diet.    Do not drink alcohol  (beer, wine, liquor) or use tobacco products.    WOUND CARE  The incisions are covered with skin glue. You can shower 24 hours after surgery and get the dressings wet.    The skin glue will stay on for 10 to 14 days after surgery.     Soap and water can get on the incisions but do not scrub the wounds. No hair dye or chemicals of any kind should get on the incisions.     Do not apply any topical ointments such as Neosporin or Hydrogen Peroxide.    Do not swim or submerge the incisions under water for 1 month.    ACTIVITY  Every day you should be up walking around the house. Do not lie in bed all day. Staying active prevents blood clots and pneumonia.    You can go up and down stairs. Do not lift more than 20 pounds or perform strenuous activity that requires straining the core muscles.    You may ride in a car but should not drive the car for at least one week.     APPOINTMENT  Please call our office at [phone] soon to make an appointment.    For questions or concerns please call our office between 8:30 a.m. and 5 p.m. Monday through Friday. The number above directs to the answering service after hours to reach the on-call physician.    Please call our office immediately for fever greater than 100.5, excess bleeding, inability to urinate, severe abdominal pain, severe diarrhea, uncontrollable vomiting.      For life threatening emergencies such as severe chest pain, difficulty breathing, or loss of conciousness call [phone].     You received a drug called Toradol which is an Anti Inflammatory at: 6pm  If you are allowed to take Anti inflammatories:    Do not take any Anti Inflammatory like Motrin, Aleve or Ibuprofen until after: 12a  Please report any suspected allergic reactions or bleeding issues to your doctor

## 2024-09-06 NOTE — ANESTHESIA POSTPROCEDURE EVALUATION
[facility]    Varun Melendez Patient Status:  Hospital Outpatient Surgery   Age/Gender 68 year old male MRN [id number]   Location [facility] POST ANESTHESIA CARE UNIT Attending Yvan Almeida MD   Hosp Day # 0 PCP Varun Romero MD       Anesthesia Post-op Note    ROBOTIC VENTRAL HERNIA REPAIR    Procedure Summary       Date: 09/06/24 Room / Location:  MAIN OR  /  MAIN OR    Anesthesia Start: 1611 Anesthesia Stop:     Procedure: ROBOTIC VENTRAL HERNIA REPAIR (Abdomen) Diagnosis:       Ventral hernia without obstruction or gangrene      (Ventral hernia without obstruction or gangrene [K43.9])    Surgeons: Yvan Almeida MD Anesthesiologist: Wolfgang Bourne MD    Anesthesia Type: general ASA Status: 3            Anesthesia Type: general    Vitals Value Taken Time   /85 09/06/24 1822   Temp 98 09/06/24 1822   Pulse 120 09/06/24 1821   Resp 15 09/06/24 1821   SpO2 93 % 09/06/24 1821   Vitals shown include unfiled device data.    Patient Location: PACU    Anesthesia Type: general    Airway Patency: extubated    Postop Pain Control: adequate    Mental Status: mildly sedated but able to meaningfully participate in the post-anesthesia evaluation    Nausea/Vomiting: none    Cardiopulmonary/Hydration status: stable euvolemic    Complications: no apparent anesthesia related complications    Postop vital signs: stable    Dental Exam: Unchanged from Preop    Patient to be discharged from PACU when criteria met.

## 2024-09-06 NOTE — ANESTHESIA PROCEDURE NOTES
Airway  Date/Time: 9/6/2024 4:16 PM  Urgency: elective    Airway not difficult    General Information and Staff    Patient location during procedure: OR  Anesthesiologist: Jose Elias Atkinson MD  Resident/CRNA: Martin Garcia CRNA  Performed: CRNA   Performed by: Martin Garcia CRNA  Authorized by: Jose Elias Atkinson MD      Indications and Patient Condition  Indications for airway management: anesthesia  Spontaneous Ventilation: absent  Sedation level: deep  Preoxygenated: yes  Patient position: sniffing  MILS maintained throughout  Mask difficulty assessment: 1 - vent by mask  Planned trial extubation    Final Airway Details  Final airway type: endotracheal airway      Successful airway: ETT  Cuffed: yes   Successful intubation technique: Video laryngoscopy  Facilitating devices/methods: intubating stylet  Endotracheal tube insertion site: oral  Blade: GlideScope  Blade size: #3  ETT size (mm): 7.5    Cormack-Lehane Classification: grade I - full view of glottis  Placement verified by: capnometry   Cuff volume (mL): 6  Measured from: lips  ETT to lips (cm): 21  Number of attempts at approach: 1  Number of other approaches attempted: 0    Additional Comments  Dentition per pre op

## 2024-09-06 NOTE — ANESTHESIA PREPROCEDURE EVALUATION
PRE-OP EVALUATION    Patient Name: Varun Melendez    Admit Diagnosis: Ventral hernia without obstruction or gangrene [K43.9]    Pre-op Diagnosis: Ventral hernia without obstruction or gangrene [K43.9]    ROBOTIC VENTRAL HERNIA REPAIR WITH MESH-COMPLEX    Anesthesia Procedure: ROBOTIC VENTRAL HERNIA REPAIR WITH MESH-COMPLEX    Surgeons and Role:     * Yvan Almeida MD - Primary    Pre-op vitals reviewed.  Temp: 98.2 °F (36.8 °C)  Pulse: 100  Resp: 18  BP: 136/94  SpO2: 99 %  Body mass index is 37.05 kg/m².    Current medications reviewed.  Hospital Medications:  • [Transfer Hold] acetaminophen (Tylenol Extra Strength) tab 1,000 mg  1,000 mg Oral Once   • lactated ringers infusion   Intravenous Continuous   • [COMPLETED] heparin (Porcine) 5000 UNIT/ML injection 5,000 Units  5,000 Units Subcutaneous Once   • vancomycin (Vancocin) 1.75 g in sodium chloride 0.9% 500mL IVPB premix  15 mg/kg Intravenous Once    And   • gentamicin (Garamycin) 480 mg in sodium chloride 0.9% 100 mL IVPB  5 mg/kg (Adjusted) Intravenous Once       Outpatient Medications:     Medications Prior to Admission   Medication Sig Dispense Refill Last Dose   • apixaban 5 MG Oral Tab Take 1 tablet (5 mg total) by mouth 2 (two) times daily.   9/2/2024 at 1300   • furosemide 20 MG Oral Tab Take 1 tablet (20 mg total) by mouth daily.   9/6/2024 at 0500   • metoprolol succinate 50 MG Oral Tablet 24 Hr Take 3 tablets (150 mg total) by mouth 2 (two) times a day. Dose increase. 540 tablet 1  at 0500   • dilTIAZem HCl ER (DILT-XR) 240 MG Oral Capsule SR 24 Hr Take 1 capsule (240 mg total) by mouth daily. 90 capsule 3 9/5/2024 at 1300   • Irbesartan 75 MG Oral Tab Take 1 tablet (75 mg total) by mouth daily. (Patient taking differently: Take 13 mg by mouth daily.) 90 tablet 3 9/5/2024 at 0500   • spironolactone 25 MG Oral Tab Take 1 tablet (25 mg total) by mouth daily. 90 tablet 3 9/6/2024 at 0500       Allergies: Azithromycin, Methylprednisolone, Penicillins,  Augmentin [amoxicillin-pot clavulanate], and Cephalexin      Anesthesia Evaluation    Patient summary reviewed.    Anesthetic Complications  (+) history of anesthetic complications  History of: PONV       GI/Hepatic/Renal      (+) GERD                           Cardiovascular  Comment:   IMPRESSION:   1. Mildly abnormal vasodilator myocardial perfusion imaging study.  2. No evidence of myocardial ischemia.   3. Small to medium, moderate fixed apical and apical lateral defect (more prominently noted on resting images). This may represent a more prominent example of apical thinning; prior myocardial infarction can not be definitively ruled out. Gated SPECT images demonstrate normal left ventricular systolic function with an estimated ejection fraction of 53%.   4. Normal hemodynamic and ECG response to vasodilator stress.   5. No prior study for comparison.        [email or web address] Garcia MD 12/14/2023 12:19  t   [id number] 12/14/2023 1:30 PM #[id number]                 Exercise tolerance: good     MET: >4    (+) obesity  (+) hypertension                  (+) dysrhythmias and atrial fibrillation                  Endo/Other                           (+) arthritis       Pulmonary                    (+) sleep apnea and CPAP      Neuro/Psych    Negative neuro/psych ROS.                                Past Surgical History:   Procedure Laterality Date   • Inguinal hernia repair  7/1/2013    Procedure: HERNIA INGUINAL REPAIR ADULT;  Surgeon: Darshan Vanegas MD;  Location:  MAIN OR   • Knee replacement surgery  02/26/2014    RTKA Per Dr. Carroll 2014   • Orthopedic surg (pbp)      left knee scope   • Orthopedic surg (pbp)  2007    left achilles tendon   • Other surgical history      right and left knee x2 surgeries- arthroscopic 2011 & 2010     Social History     Socioeconomic History   • Marital status:    Tobacco Use   • Smoking status: Never   • Smokeless tobacco: Never   Vaping Use   • Vaping status: Never Used   Substance  and Sexual Activity   • Alcohol use: Yes     Comment: RARE   • Drug use: No     History   Drug Use No     Available pre-op labs reviewed.     Lab Results   Component Value Date     (L) 09/04/2024    K 4.6 09/04/2024     09/04/2024    CO2 16.0 (L) 09/04/2024    BUN 22 09/04/2024    CREATSERUM 1.26 09/04/2024     (H) 09/04/2024    CA 10.3 09/04/2024            Airway      Mallampati: II  Mouth opening: 3 FB  TM distance: 4 - 6 cm  Neck ROM: full Cardiovascular    Cardiovascular exam normal.  Rhythm: regular  Rate: normal  (-) murmur   Dental    Dentition appears grossly intact         Pulmonary    Pulmonary exam normal.  Breath sounds clear to auscultation bilaterally.               Other findings        ASA: 3   Plan: general  NPO status verified and patient meets guidelines.  Patient has taken beta blockers in last 24 hours.      Comment: GETA discussed in detail.  Risk of sore throat, cough, dental trauma, PONV discussed.  Patient expresses understanding and wishes to proceed. All questions answered.    Plan/risks discussed with: patient            Present on Admission:  **None**

## 2024-09-09 ENCOUNTER — TELEPHONE (OUTPATIENT)
Facility: LOCATION | Age: 69
End: 2024-09-09

## 2024-09-09 NOTE — TELEPHONE ENCOUNTER
Patient called to update that Pat Monreal, at the insurance company, and to include her name in the faxes.

## 2024-09-09 NOTE — TELEPHONE ENCOUNTER
Patient calling because his worker's [employer] called and told him that the surgery wasn't approved. They need notes from his 8/29 appointment, and the notes from his surgery.   They're going to approve it, it didn't go through the proper channels, since everything happened so fast.     Please advise  Best callback number is [phone]

## 2024-09-19 ENCOUNTER — OFFICE VISIT (OUTPATIENT)
Facility: LOCATION | Age: 69
End: 2024-09-19
Payer: OTHER MISCELLANEOUS

## 2024-09-19 VITALS — HEART RATE: 81 BPM | TEMPERATURE: 97 F

## 2024-09-19 DIAGNOSIS — K43.9 VENTRAL HERNIA WITHOUT OBSTRUCTION OR GANGRENE: ICD-10-CM

## 2024-09-19 DIAGNOSIS — Z98.890 POSTOPERATIVE STATE: Primary | ICD-10-CM

## 2024-09-19 PROCEDURE — 99212 OFFICE O/P EST SF 10 MIN: CPT

## 2024-09-19 NOTE — PROGRESS NOTES
Follow Up Visit Note       Active Problems      1. Postoperative state    2. Ventral hernia without obstruction or gangrene          Chief Complaint   Chief Complaint   Patient presents with    Post-Op     P/O robotic ventral hernia repair 9/6 DR ALMEIDA          History of Present Illness  Varun is a 68 year old male who underwent robotic ventral hernia repair with Dr. Almeida on 9/6/20224. He presents to clinic today for follow up evaluation.    He reports he is doing well postoperatively. He reports mild incisional pain. He reports he is not taking any pain medication at this time. He denies nausea or vomiting with eating. He reports constipation but states this resolved with stool softeners. He denies urinary symptoms. He denies fever or chills. He reports some firmness around his umbilical incision. He denies changes to the color of the skin or a protuberance.     Allergies  Varun is allergic to azithromycin, methylprednisolone, penicillins, augmentin [amoxicillin-pot clavulanate], and cephalexin.    Past Medical / Surgical / Social / Family History    The past medical and past surgical history have been reviewed by me today.    Past Medical History:    Arrhythmia    A-Fib diagnosed in 2003    Cardiomyopathy (HCC)    Difficult intubation    HX OF, UNKNOWN TO PATIENT    Esophageal reflux    Essential hypertension    HIGH BLOOD PRESSURE    Hx of motion sickness    Inguinal hernia unilateral, non-recurrent    RUBEN (obstructive sleep apnea)    AHI 70 (mostly central apneas)    Osteoarthritis    left knee , a little in hips    PONV (postoperative nausea and vomiting)    rt total knee replacement- global thru 5/27/14     Sleep apnea    CPAP    Visual impairment    GLASSES     Past Surgical History:   Procedure Laterality Date    Inguinal hernia repair  7/1/2013    Procedure: HERNIA INGUINAL REPAIR ADULT;  Surgeon: Darshan Vanegas MD;  Location:  MAIN OR    Knee replacement surgery  02/26/2014    RTKA Per  Dr. Carroll 2014    Orthopedic surg (pbp)      left knee scope    Orthopedic surg (pbp)  2007    left achilles tendon    Other surgical history      right and left knee x2 surgeries- arthroscopic 2011 & 2010       The family history and social history have been reviewed by me today.    Family History   Problem Relation Age of Onset    Heart Disorder Father      Social History     Socioeconomic History    Marital status:    Tobacco Use    Smoking status: Never    Smokeless tobacco: Never   Vaping Use    Vaping status: Never Used   Substance and Sexual Activity    Alcohol use: Yes     Comment: RARE    Drug use: No        Current Outpatient Medications:     apixaban 5 MG Oral Tab, Take 1 tablet (5 mg total) by mouth 2 (two) times daily., Disp: , Rfl:     oxyCODONE 5 MG Oral Tab, Take 1 tablet (5 mg total) by mouth every 6 (six) hours as needed. (Patient not taking: Reported on 9/19/2024), Disp: 20 tablet, Rfl: 0    furosemide 20 MG Oral Tab, Take 1 tablet (20 mg total) by mouth daily., Disp: , Rfl:     metoprolol succinate 50 MG Oral Tablet 24 Hr, Take 3 tablets (150 mg total) by mouth 2 (two) times a day. Dose increase., Disp: 540 tablet, Rfl: 1    dilTIAZem HCl ER (DILT-XR) 240 MG Oral Capsule SR 24 Hr, Take 1 capsule (240 mg total) by mouth daily., Disp: 90 capsule, Rfl: 3    Irbesartan 75 MG Oral Tab, Take 1 tablet (75 mg total) by mouth daily. (Patient taking differently: Take 13 mg by mouth daily.), Disp: 90 tablet, Rfl: 3    spironolactone 25 MG Oral Tab, Take 1 tablet (25 mg total) by mouth daily., Disp: 90 tablet, Rfl: 3     Review of Systems  The Review of Systems has been reviewed by me during today.  Review of Systems   Constitutional:  Negative for appetite change, chills, fatigue and fever.   Gastrointestinal:  Positive for constipation. Negative for abdominal distention, abdominal pain, diarrhea, nausea and vomiting.   Genitourinary:  Negative for difficulty urinating, dysuria, hematuria and  urgency.   Skin:  Negative for rash and wound.        Physical Findings   Pulse 81   Temp 97 °F (36.1 °C) (Temporal)   Physical Exam  Vitals reviewed.   Constitutional:       General: He is not in acute distress.     Appearance: He is not ill-appearing.   HENT:      Head: Normocephalic and atraumatic.   Eyes:      General: No scleral icterus.     Conjunctiva/sclera: Conjunctivae normal.   Pulmonary:      Effort: Pulmonary effort is normal. No respiratory distress.   Abdominal:      General: There is no distension.      Palpations: Abdomen is soft.      Tenderness: There is no abdominal tenderness. There is no guarding or rebound.      Comments: Abdomen is soft, non-distended, non-tender to palpation. Umbilicus is firm without surrounding skin changes. No protuberance noted. Small fluid collection noted under umbilicus.    Skin:     General: Skin is warm and dry.   Neurological:      Mental Status: He is alert.   Psychiatric:         Mood and Affect: Mood normal.         Behavior: Behavior normal.          Assessment   1. Postoperative state    2. Ventral hernia without obstruction or gangrene        Plan   The patient is doing well postoperatively.  Continue Diet as tolerated.  Tylenol and Ibuprofen as needed for pain control.   I discussed with the patient that the firmness near his umbilicus is likely a small fluid collection- seroma vs hematoma.  He can apply a warm compress to the area for 15 minutes, several times a day. I discussed it will  ultimately take time for the fluid to be reabsorbed. If area does not improve by 6-8 weeks, post op he may return to clinic for re-evaluation.   Continue local wound care, soap and water to the incisions.   Pathology discussed with the patient.   Recommend Lifting restrictions of no greater than 10 lbs for 4 weeks postoperatively  A return to work note with the above restrictions was provided to the patient at today's visit.   All the patient's questions and concerns were  addressed. They voiced understanding and are in agreement with the plan     Follow Up  They may follow up with [facility] general surgery on an as-needed basis.      Estefania Garcia PA-C

## (undated) DEVICE — BOWL CEMENT MIX QUICK-VAC

## (undated) DEVICE — LIGHT HANDLE

## (undated) DEVICE — PADDING CAST COTTON  4

## (undated) DEVICE — TUBING MEGADYNE SPECULUM

## (undated) DEVICE — APPLICATOR CHLORAPREP 10.5ML

## (undated) DEVICE — DRAPE,U/SHT,SPLIT,FILM,60X84,STERILE: Brand: MEDLINE

## (undated) DEVICE — BLADELESS OBTURATOR: Brand: WECK VISTA

## (undated) DEVICE — GLOVE SUR 7.5 SENSICARE PI PIP GRN PWD F

## (undated) DEVICE — SLEEVE KENDALL SCD EXPRESS MED

## (undated) DEVICE — Device: Brand: STABLECUT®

## (undated) DEVICE — STERILE SYNTHETIC POLYISOPRENE POWDER-FREE SURGICAL GLOVES WITH HYDROGEL COATING, SMOOTH FINISH, STRAIGHT FINGER: Brand: PROTEXIS

## (undated) DEVICE — SOL  .9 1000ML BAG

## (undated) DEVICE — STERILE DRAPE FOR USE WITH SITUATE ROOM SCANNER: Brand: SITUATE

## (undated) DEVICE — GOWN,SIRUS,FABRIC-REINFORCED,X-LARGE: Brand: MEDLINE

## (undated) DEVICE — ENDOPATH ULTRA VERESS INSUFFLATION NEEDLES WITH LUER LOCK CONNECTORS: Brand: ENDOPATH

## (undated) DEVICE — ARM DRAPE

## (undated) DEVICE — SUT MCRYL 4-0 18IN PS-2 ABSRB UD 19MM 3/8 CIR

## (undated) DEVICE — BANDAGE ESMARK 6IN W/O LINER

## (undated) DEVICE — SUT STRATAFIX SYMMTRC PDS + 1 L12IN ABSRB

## (undated) DEVICE — ATTUNE PINNING SYSTEM
Type: IMPLANTABLE DEVICE | Site: KNEE
Brand: ATTUNE

## (undated) DEVICE — SPECIMEN CONTAINER,POSITIVE SEAL INDICATOR, OR PACKAGED: Brand: PRECISION

## (undated) DEVICE — TIP COVER ACCESSORY

## (undated) DEVICE — COLUMN DRAPE

## (undated) DEVICE — MEGA NEEDLE DRIVER: Brand: ENDOWRIST

## (undated) DEVICE — 2T11 #2 PDO 36 X 36: Brand: 2T11 #2 PDO 36 X 36

## (undated) DEVICE — SOLUTION  .9 1000ML BTL

## (undated) DEVICE — SUT ORTHOCORD 2 OS-6 223103

## (undated) DEVICE — TRAY CATH 16FR F INCL BARDX IC COMPLT CARE

## (undated) DEVICE — ADHESIVE SKIN TOP FOR WND CLSR DERMBND ADV

## (undated) DEVICE — SIGMA LCS HIGH PERFORMANCE STERILE THREADED HEADED PINS: Brand: SIGMA LCS HIGH PERFORMANCE

## (undated) DEVICE — LAPAROVUE VISIBILITY SYSTEM LAPAROSCOPIC SOLUTIONS: Brand: LAPAROVUE

## (undated) DEVICE — 450 ML BOTTLE OF 0.05% CHLORHEXIDINE GLUCONATE IN 99.95% STERILE WATER FOR IRRIGATION, USP AND APPLICATOR.: Brand: IRRISEPT ANTIMICROBIAL WOUND LAVAGE

## (undated) DEVICE — STERILE POLYISOPRENE POWDER-FREE SURGICAL GLOVES: Brand: PROTEXIS

## (undated) DEVICE — STERILE PATIENT PROTECTIVE PAD FOR IMP® KNEE POSITIONERS & COHESIVE WRAP (10 / CASE): Brand: DE MAYO KNEE POSITIONER®

## (undated) DEVICE — SUT VICRYL 2-0 CP-1 J266H

## (undated) DEVICE — HOOD: Brand: FLYTE

## (undated) DEVICE — PBT NON ABSORBABLE WOUND CLOSURE DEVICE: Brand: V-LOC

## (undated) DEVICE — MONOPOLAR CURVED SCISSORS: Brand: ENDOWRIST

## (undated) DEVICE — WRAP COOLING KNEE W/ICE PILLOW

## (undated) DEVICE — MEGA SUTURECUT ND: Brand: ENDOWRIST

## (undated) DEVICE — HOOD, PEEL-AWAY: Brand: FLYTE

## (undated) DEVICE — CANNULA SEAL

## (undated) DEVICE — STANDARD HYPODERMIC NEEDLE,POLYPROPYLENE HUB: Brand: MONOJECT

## (undated) DEVICE — COVER,LIGHT,CAMERA,HARD,1/PK,STRL: Brand: MEDLINE

## (undated) DEVICE — #15 STERILE STAINLESS BLADE: Brand: STERILE STAINLESS BLADES

## (undated) DEVICE — DISPOSABLE TOURNIQUET CUFF SINGLE BLADDER, DUAL PORT AND QUICK CONNECT CONNECTOR: Brand: COLOR CUFF

## (undated) DEVICE — GLOVE SUR 7 SENSICARE PI PIP CRM PWD F

## (undated) DEVICE — 40580 - THE PINK PAD - ADVANCED TRENDELENBURG POSITIONING KIT: Brand: 40580 - THE PINK PAD - ADVANCED TRENDELENBURG POSITIONING KIT

## (undated) DEVICE — TOTAL KNEE CDS: Brand: MEDLINE INDUSTRIES, INC.

## (undated) DEVICE — FENESTRATED BIPOLAR FORCEPS: Brand: ENDOWRIST

## (undated) DEVICE — APPLICATOR CHLORAPREP 26ML

## (undated) DEVICE — BANDAGE COMP PREMPRO 5YDX4IN

## (undated) DEVICE — SIGMA LCS HIGH PERFORMANCE INSTRUMENTS STERILE THREADED PINS: Brand: SIGMA LCS HIGH PERFORMANCE

## (undated) DEVICE — SYRINGE 30ML LL TIP

## (undated) DEVICE — DRESS WOUND AQUACEL 3.5INX12IN

## (undated) DEVICE — CONVERTORS STOCKINETTE: Brand: CONVERTORS

## (undated) DEVICE — ROBOTIC GENERAL: Brand: MEDLINE INDUSTRIES, INC.

## (undated) DEVICE — E-Z BUTTON SWITCH PENCIL

## (undated) NOTE — LETTER
DR. TEE DRAKE    Surgical Clearance Needed    Date: 8/29/2024                                                                       From: TANESHA Hoangn: DR. POLLO REED                                                  Fax: [phone]     RE: Surgical Clearance               # of Pages: 1        Patient Name: Varun Melendez    Patient YOB: 1955    Consult For: CARDIAC CLEARANCE AND ANTICOAGULATION MANAGEMENT: STOP ELIQUIS FOR 72 HOURS PRIOR TO PROCEDURE    Surgery: ROBOTIC VENTRAL HERNIA REPAIR WITH MESH-COMPLEX AT [facility]    Date of Surgery: 9/6/24        This facsimile transmission is provided for your internal use only. If the reader of this message is not the intended recipient, you are hereby notified that you have received this document in error, and that any review, dissemination, distribution, or copying of this message is strictly prohibited. If you have received this communication in error, please notify us immediately by telephone and return the original message to us by mail.

## (undated) NOTE — LETTER
[facility]  Phone: [phone]  Right Fax: [phone]    ADDRESSEE INFORMATION: SENDER INFORMATION:   To:   DR. POLLO REED From: WENDY     Department: Pre-Admission Testing   Fax Number: [phone] Date: 2024     Phone Number:  Phone Number: [phone]   Re: Patient Name: Varun Melendez  CSN: [id number]  Medical Record: [id number]   : 1955 - A: 68 y    Sex: male Fax Number: [phone]     Number of Pages (Including Cover Sheet) 01       Clearance for Surgery Requested by Surgeon      Request for:  Cardiac Clearance WITH BMP/EKG    Requested by Surgeon: TEE GASTON    Surgical Date: 2024    Procedure: ROBOTIC VENTRAL HERNIA REPAIR WITH MESH-COMPLEX, N/A        Please fax the clearance note to the Pre-Admission Testing department [phone].    Thank you.            This message is intended only for the use of the individual or entity to which it is addressed. It may have been disclosed to you from records whose confidentiality is protected by Federal law and applicable state law. Federal Regulation, 45 C. F. R., part 164, prohibits you from making any further disclosure without specific authorization of the person to whom it pertains, or as otherwise permitted by such regulations. If the reader of this message is not the intended recipient, you are hereby notified that reading, disseminating, distributing or copying this communication is strictly prohibited. If you have received this communication in error, please immediately notify us by telephone and return the original message to us at [address] [place] [phone] via the [place] Postal Service.

## (undated) NOTE — LETTER
OUTSIDE TESTING RESULT REQUEST     IMPORTANT: FOR YOUR IMMEDIATE ATTENTION  Please FAX all test results listed below to: [phone]     Testing already done on or about:  APPT- NEEDS PRE OP      * * * * If testing is NOT complete, arrange with patient A.S.A.P. * * * *      Patient Name: Kaylyn Wilder  Surgery Date: 10/6/2022  CSN: [id number]  Medical Record: [id number]   : 1955 - A: 74 y      Sex: adult  Surgeon(s):  Anshu Quintanilla MD  Procedure: LEFT TOTAL KNEE ARTHROPLASTY  Anesthesia Type: Spinal     Surgeon: Anshu Quintanilla MD     The following Testing and Time Line are REQUIRED PER ANESTHESIA     EKG READ AND SIGNED WITHIN   90 days  CBC, Platelet; NO Differential within  90 days  BMP (requires 4 hour fast) within  90 days  MSSA/MRSA Nasal screening within 30 days      Thank You,   Sent by:chacorta morris

## (undated) NOTE — IP AVS SNAPSHOT
[address]            (For Outpatient Use Only) Initial Admit Date: 10/6/2022   Inpt/Obs Admit Date: Inpt: N/A / Obs: N/A   Discharge Date:    Hospital Acct:  [de-identified]   MRN: [de-identified]   CSN: [id number]   CEID: [id number]        ENCOUNTER  Patient Class: OPIB Admitting Provider: Benjy Bess MD Unit: [address] 3SW-A   Hospital Service: Ortho/Spine Attending Provider: Benjy Bess MD   Bed: 363-A   Visit Type:   Referring Physician: No ref. provider found Billing Flag:    Admit Diagnosis: OSTEOARTHRITIS LEFT KNEE      PATIENT  Legal Name:   Eagle Artis   Legal Sex: Male  Gender ID: Male             [address] [facility] Name:    PCP:  Radha Tsang MD Home: [phone]   Address:  [facility] : 1955 (66 yrs) Mobile: [phone]         [place] [address] Marital:  Language: [place] park: Arely Li SSN4: [id number] Rastafarian: Wishes Privacy     Race: White Ethnicity: Non  Or [address] [place] [address]   Name Relationship Legal Guardian? Home Phone Work Phone Mobile Phone   1.  Joshua Melendez  2. *No Contact Specified* Spouse      [phone]       GUARANTOR  Guarantor: Kaylie Melendez : 1955 Home Phone: [phone]   Address: [facility]  Sex: Male Work Phone: [phone]   [place]/State/Zip: [address] [facility] [place] [address]   Rel. to Patient: Self Guarantor ID: [id number]   GUARANTOR EMPLOYER   Employer:  Status: SELF EMPL*     COVERAGE  PRIMARY INSURANCE   Payor: [place] Plan: [facility] HEALTH PART*   Group Number: [id number] Insurance Type: [address] Name: Darryl Thomas : 1961   Subscriber ID: [id number] Pt Rel to Subscriber: Spouse   SECONDARY INSURANCE   Payor:  Plan:    Group Number:  Insurance Type:    Subscriber Name:  Subscriber :    Subscriber ID:  Pt Rel to Subscriber:    TERTIARY INSURANCE   Payor:  Plan:    Group Number:  Insurance Type:    Subscriber Name:  Subscriber :    Subscriber ID:  Pt Rel to Subscriber:    Hospital Account Financial Class: [facility]    October 7, 2022

## (undated) NOTE — LETTER
2024    Return to Work     Name: Varun Melendez        : 1955    To Whom It May Concern,    Varun Melendez had surgery on 2024 and is:    Able to return to school / work with restrictions:  No lifting over: 10 lbs until 10/4/2024.    The patient may return to work on 2024.    If there are any further questions, regarding this patient's care, please contact the patient directly.    Sincerely,    Estefania Garcia PA-C

## (undated) NOTE — LETTER
[facility]  Phone: [phone]  OUTSIDE TESTING RESULT REQUEST      TO:   DR. SUMMERS Today's Date: 24    FAX #: [phone]     IMPORTANT: FOR YOUR IMMEDIATE ATTENTION  Please FAX all test results listed below to: [phone]     Testing already done on or about:      * * * * If testing is NOT complete, arrange with patient A.S.A.P. * * * *      Patient Name: Varun Melendez  Surgery Date: 2024    CSN: [id number]  Medical Record: [id number]   : 1955 - A: 68 y      Sex: male  Surgeon(s):  Yvan Almeida MD  Procedure: ROBOTIC VENTRAL HERNIA REPAIR WITH MESH-COMPLEX  Anesthesia Type: General     Surgeon: Yvan Almeida MD       The following Testing and Time Line are REQUIRED PER ANESTHESIA     EKG READ AND SIGNED WITHIN   90 days  BMP  READ AND SIGNED WITHIN 90 DAYS    PATIENT NEEDS CARDIAC CLEARANCE, THIS WAS REQUESTED FROM DR. SAGRARIO REED      Thank You,   Sent by: AVERY YOON RN      CONFIDENTIALITY NOTICE  This transmission is intended only for the use of the individual or entity to which it is addressed and may contain information that is privileged and confidential.  If the reader of this message is not the intended recipient, you are hereby notified that any disclosure, distribution, or copying of this information is strictly prohibited.  If you have received this transmission in error, please notify us immediately by telephone, and return the original documents to us at the address listed above.

## (undated) NOTE — LETTER
08/29/24      RE:  VARUN MELENDEZ  [address] [place] [phone]        To Whom It May Concern:    Varun Melendez is a patient under my medical care.  He will be undergoing a procedure on 9/6/24 and will need to remain off work until 9/20/24.  He can return to work on 9/20/24, but should remain on light duty through 10/18/24.    Thank  you.      Yvan Almeida MD